# Patient Record
Sex: FEMALE | Race: WHITE | NOT HISPANIC OR LATINO | URBAN - METROPOLITAN AREA
[De-identification: names, ages, dates, MRNs, and addresses within clinical notes are randomized per-mention and may not be internally consistent; named-entity substitution may affect disease eponyms.]

---

## 2017-01-02 ENCOUNTER — OFFICE VISIT (OUTPATIENT)
Dept: FAMILY MEDICINE | Facility: CLINIC | Age: 62
End: 2017-01-02
Payer: COMMERCIAL

## 2017-01-02 VITALS
TEMPERATURE: 97.4 F | SYSTOLIC BLOOD PRESSURE: 138 MMHG | BODY MASS INDEX: 28.53 KG/M2 | HEIGHT: 63 IN | WEIGHT: 161 LBS | DIASTOLIC BLOOD PRESSURE: 88 MMHG | HEART RATE: 85 BPM

## 2017-01-02 DIAGNOSIS — J02.9 PHARYNGITIS, UNSPECIFIED ETIOLOGY: Primary | ICD-10-CM

## 2017-01-02 LAB
DEPRECATED S PYO AG THROAT QL EIA: NORMAL
MICRO REPORT STATUS: NORMAL
SPECIMEN SOURCE: NORMAL

## 2017-01-02 PROCEDURE — 87880 STREP A ASSAY W/OPTIC: CPT | Performed by: FAMILY MEDICINE

## 2017-01-02 PROCEDURE — 87081 CULTURE SCREEN ONLY: CPT | Performed by: FAMILY MEDICINE

## 2017-01-02 PROCEDURE — 99202 OFFICE O/P NEW SF 15 MIN: CPT | Performed by: FAMILY MEDICINE

## 2017-01-02 NOTE — NURSING NOTE
"Chief Complaint   Patient presents with     Pharyngitis       Initial /88 mmHg  Pulse 85  Temp(Src) 97.4  F (36.3  C) (Oral)  Ht 5' 3\" (1.6 m)  Wt 161 lb (73.029 kg)  BMI 28.53 kg/m2  Breastfeeding? No Estimated body mass index is 28.53 kg/(m^2) as calculated from the following:    Height as of this encounter: 5' 3\" (1.6 m).    Weight as of this encounter: 161 lb (73.029 kg).  BP completed using cuff size: gilbert Melvin CMA          "

## 2017-01-02 NOTE — PROGRESS NOTES
"  SUBJECTIVE:                                                    Linda Whyte is a 61 year old female who presents to clinic today for the following health issues:      Sore throat and Ha for 4 days. Some nasal congestion    Linda Whyte is a 61 year old female   with a chief complaint of sore throat.  Onset of symptoms was 4 day(s) ago.    Course of illness: still present and constant.  Severity moderate  Current and Associated symptoms: sore throat, headache and malaise  Treatment measures tried include None tried.  Predisposing factors include None.    History reviewed. No pertinent past medical history.    ALLERGIES:  Review of patient's allergies indicates no known allergies.      No current outpatient prescriptions on file prior to visit.  No current facility-administered medications on file prior to visit.    Social History   Substance Use Topics     Smoking status: Never Smoker      Smokeless tobacco: Not on file     Alcohol Use: Yes      Comment: occ       History reviewed. No pertinent family history.      ROS:  INTEGUMENTARY/SKIN: NEGATIVE for worrisome rashes, moles or lesions  EYES: NEGATIVE for vision changes or irritation  GI: NEGATIVE for nausea, abdominal pain, heartburn, or change in bowel habits    OBJECTIVE:   /88 mmHg  Pulse 85  Temp(Src) 97.4  F (36.3  C) (Oral)  Ht 5' 3\" (1.6 m)  Wt 161 lb (73.029 kg)  BMI 28.53 kg/m2  Breastfeeding? No  GENERAL APPEARANCE: alert, mild distress and cooperative   EYES: EOMI,  PERRL, conjunctiva clear,HENT: ear canals and TM's normal.  Nose normal.  Pharynx erythematous with some exudate noted.,NECK: supple, non-tender to palpation, no adenopathy noted,RESP: lungs clear to auscultation - no rales, rhonchi or wheezes,CV: regular rates and rhythm, normal S1 S2, no murmer noted,ABDOMEN:  soft, nontender, no HSM or masses and bowel sounds normal,SKIN: no suspicious lesions or rashes    Rapid Strep test is negative    ASSESSMENT:  Pharyngitis     - " Rapid strep screen     I discussed with the patient that a confirmatory strep culture will be performed and that she will be called if the culture is positive for strep.  We discussed other possible causes of pharyngitis including cold viruses    Symptomatic treat with gargles, lozenges, and OTC analgesic as needed. Follow-up with primary clinic if not improving.

## 2017-01-04 LAB
BACTERIA SPEC CULT: NORMAL
MICRO REPORT STATUS: NORMAL
SPECIMEN SOURCE: NORMAL

## 2017-04-13 ENCOUNTER — OFFICE VISIT (OUTPATIENT)
Dept: FAMILY MEDICINE | Facility: CLINIC | Age: 62
End: 2017-04-13
Payer: COMMERCIAL

## 2017-04-13 VITALS
TEMPERATURE: 97.6 F | SYSTOLIC BLOOD PRESSURE: 140 MMHG | HEIGHT: 63 IN | DIASTOLIC BLOOD PRESSURE: 90 MMHG | HEART RATE: 64 BPM | OXYGEN SATURATION: 98 % | WEIGHT: 163.3 LBS | BODY MASS INDEX: 28.93 KG/M2

## 2017-04-13 DIAGNOSIS — Z23 NEED FOR PROPHYLACTIC VACCINATION WITH TETANUS-DIPHTHERIA (TD): ICD-10-CM

## 2017-04-13 DIAGNOSIS — Z12.11 SCREEN FOR COLON CANCER: ICD-10-CM

## 2017-04-13 DIAGNOSIS — I10 ESSENTIAL HYPERTENSION: Primary | ICD-10-CM

## 2017-04-13 DIAGNOSIS — Z23 ENCOUNTER FOR IMMUNIZATION: ICD-10-CM

## 2017-04-13 DIAGNOSIS — K21.9 GASTROESOPHAGEAL REFLUX DISEASE WITHOUT ESOPHAGITIS: ICD-10-CM

## 2017-04-13 DIAGNOSIS — Z12.31 VISIT FOR SCREENING MAMMOGRAM: ICD-10-CM

## 2017-04-13 DIAGNOSIS — Z11.59 NEED FOR HEPATITIS C SCREENING TEST: ICD-10-CM

## 2017-04-13 DIAGNOSIS — Z00.00 ENCOUNTER FOR ROUTINE ADULT HEALTH EXAMINATION WITHOUT ABNORMAL FINDINGS: ICD-10-CM

## 2017-04-13 PROCEDURE — 99396 PREV VISIT EST AGE 40-64: CPT | Mod: 25 | Performed by: NURSE PRACTITIONER

## 2017-04-13 PROCEDURE — 90471 IMMUNIZATION ADMIN: CPT | Performed by: NURSE PRACTITIONER

## 2017-04-13 PROCEDURE — 90715 TDAP VACCINE 7 YRS/> IM: CPT | Performed by: NURSE PRACTITIONER

## 2017-04-13 RX ORDER — NAPROXEN 500 MG/1
500 TABLET ORAL 2 TIMES DAILY PRN
Qty: 90 TABLET | Refills: 1 | Status: SHIPPED | OUTPATIENT
Start: 2017-04-13 | End: 2017-12-01

## 2017-04-13 RX ORDER — HYDROCHLOROTHIAZIDE 12.5 MG/1
12.5 CAPSULE ORAL DAILY
Qty: 30 CAPSULE | Refills: 0 | Status: SHIPPED | OUTPATIENT
Start: 2017-04-13 | End: 2017-05-10

## 2017-04-13 RX ORDER — LOSARTAN POTASSIUM 100 MG/1
100 TABLET ORAL DAILY
Qty: 90 TABLET | Refills: 3 | Status: SHIPPED | OUTPATIENT
Start: 2017-04-13 | End: 2018-04-01

## 2017-04-13 RX ORDER — ESTRADIOL 0.5 MG/1
0.5 TABLET ORAL DAILY
Qty: 90 TABLET | Refills: 3 | Status: SHIPPED | OUTPATIENT
Start: 2017-04-13 | End: 2018-04-01

## 2017-04-13 NOTE — NURSING NOTE
"Chief Complaint   Patient presents with     Physical       Initial /90 (BP Location: Right arm, Patient Position: Chair, Cuff Size: Adult Large)  Pulse 64  Temp 97.6  F (36.4  C) (Oral)  Ht 5' 3\" (1.6 m)  Wt 163 lb 4.8 oz (74.1 kg)  SpO2 98%  Breastfeeding? No  BMI 28.93 kg/m2 Estimated body mass index is 28.93 kg/(m^2) as calculated from the following:    Height as of this encounter: 5' 3\" (1.6 m).    Weight as of this encounter: 163 lb 4.8 oz (74.1 kg).  Medication Reconciliation: complete   KELL Mathias      "

## 2017-04-13 NOTE — PROGRESS NOTES
SUBJECTIVE:     CC: Linda Whyte is an 61 year old woman who presents for preventive health visit.     Healthy Habits:    Do you get at least three servings of calcium containing foods daily (dairy, green leafy vegetables, etc.)? yes    Amount of exercise or daily activities, outside of work: 4 day(s) per week    Problems taking medications regularly No    Medication side effects: No    Have you had an eye exam in the past two years? yes    Do you see a dentist twice per year? yes    Do you have sleep apnea, excessive snoring or daytime drowsiness?always has trouble sleeping   Patient is here for a complete physical exam. Recently moved her from [place] to live with her boyfriend. Retired . Active walking daily and eating a healthy diet. Lives in [place] part of the year and [place] the other half of the year.           Today's PHQ-2 Score:   PHQ-2 ( 1999 Pfizer) 1/2/2017   Q1: Little interest or pleasure in doing things 0   Q2: Feeling down, depressed or hopeless 0   PHQ-2 Score 0       Abuse: Current or Past(Physical, Sexual or Emotional)- No  Do you feel safe in your environment - Yes    Social History   Substance Use Topics     Smoking status: Never Smoker     Smokeless tobacco: Never Used     Alcohol use Yes      Comment: occ     The patient does not drink >3 drinks per day nor >7 drinks per week.    No results for input(s): CHOL, HDL, LDL, TRIG, CHOLHDLRATIO, NHDL in the last 04288 hours.    Reviewed orders with patient.  Reviewed health maintenance and updated orders accordingly - Yes    Mammo Decision Support:  Patient over age 50, mutual decision to screen reflected in health maintenance.    Pertinent mammograms are reviewed under the imaging tab.  History of abnormal Pap smear: NO - age 30- 65 PAP every 3 years recommended    Reviewed and updated as needed this visit by clinical staff  Tobacco  Allergies  Meds  Problems  Med Hx  Surg Hx  Fam Hx  Soc Hx       "    Reviewed and updated as needed this visit by Provider  Allergies  Meds  Problems            ROS:  C: NEGATIVE for fever, chills, change in weight  I: NEGATIVE for worrisome rashes, moles or lesions  E: NEGATIVE for vision changes or irritation  ENT: NEGATIVE for ear, mouth and throat problems  R: NEGATIVE for significant cough or SOB  B: NEGATIVE for masses, tenderness or discharge  CV: NEGATIVE for chest pain, palpitations or peripheral edema  GI: NEGATIVE for nausea, abdominal pain, heartburn, or change in bowel habits  : NEGATIVE for unusual urinary or vaginal symptoms. No vaginal bleeding.  M: NEGATIVE for significant arthralgias or myalgia  N: NEGATIVE for weakness, dizziness or paresthesias  P: NEGATIVE for changes in mood or affect     Problem list, Medication list, Allergies, and Medical/Social/Surgical histories reviewed in EPIC and updated as appropriate.  OBJECTIVE:     /90 (BP Location: Right arm, Patient Position: Chair, Cuff Size: Adult Large)  Pulse 64  Temp 97.6  F (36.4  C) (Oral)  Ht 5' 3\" (1.6 m)  Wt 163 lb 4.8 oz (74.1 kg)  SpO2 98%  Breastfeeding? No  BMI 28.93 kg/m2  EXAM:  GENERAL APPEARANCE: healthy, alert and no distress  EYES: Eyes grossly normal to inspection, PERRL and conjunctivae and sclerae normal  HENT: ear canals and TM's normal, nose and mouth without ulcers or lesions, oropharynx clear and oral mucous membranes moist  NECK: no adenopathy, no asymmetry, masses, or scars and thyroid normal to palpation  RESP: lungs clear to auscultation - no rales, rhonchi or wheezes  BREAST: normal without masses, tenderness or nipple discharge and no palpable axillary masses or adenopathy  CV: regular rate and rhythm, normal S1 S2, no S3 or S4, no murmur, click or rub, no peripheral edema and peripheral pulses strong  ABDOMEN: soft, nontender, no hepatosplenomegaly, no masses and bowel sounds normal   (female): normal female external genitalia, normal urethral meatus, " vaginal mucosal atrophy noted, normal cervix, adnexae, and uterus without masses or abnormal discharge  MS: no musculoskeletal defects are noted and gait is age appropriate without ataxia  SKIN: no suspicious lesions or rashes  NEURO: Normal strength and tone, sensory exam grossly normal, mentation intact and speech normal  PSYCH: mentation appears normal and affect normal/bright    ASSESSMENT/PLAN:     1. Encounter for routine adult health examination without abnormal findings  Normal examination with medications refilled. Fasting labs completed today.   - naproxen (NAPROSYN) 500 MG tablet; Take 1 tablet (500 mg) by mouth 2 times daily as needed for moderate pain  Dispense: 90 tablet; Refill: 1  - estradiol (ESTRACE) 0.5 MG tablet; Take 1 tablet (0.5 mg) by mouth daily  Dispense: 90 tablet; Refill: 3  - Lipid panel reflex to direct LDL; Future  - Comprehensive metabolic panel; Future    2. Screen for colon cancer  Unwilling to do colonoscopy but has agreed to a fit test.   - Fecal colorectal cancer screen (FIT); Future    3. Visit for screening mammogram  Mammogram to be scheduled. Clinical breast exam is normal.   - MA SCREENING DIGITAL BILAT - Future  (s+30); Future    4. Need for hepatitis C screening test  Hepatitis C screen x1.  - Hepatitis C Screen Reflex to HCV RNA Quant and Genotype; Future    5. Need for prophylactic vaccination with tetanus-diphtheria (TD)  Tdap given today.     6. Essential hypertension  Blood pressure is borderline. Added HCTZ 12.5 mg daily to bring blood pressure to healthier level. Follow up for nurse only visit for BP check,.   - losartan (COZAAR) 100 MG tablet; Take 1 tablet (100 mg) by mouth daily  Dispense: 90 tablet; Refill: 3  - hydrochlorothiazide (MICROZIDE) 12.5 MG capsule; Take 1 capsule (12.5 mg) by mouth daily  Dispense: 30 capsule; Refill: 0    7. Gastroesophageal reflux disease without esophagitis    - omeprazole (PRILOSEC) 20 MG CR capsule; Take 1 capsule (20 mg) by  "mouth daily  Dispense: 90 capsule; Refill: 3    8. Encounter for immunization    - TDAP VACCINE (ADACEL)    COUNSELING:   Reviewed preventive health counseling, as reflected in patient instructions       Regular exercise       Healthy diet/nutrition       Vision screening       Osteoporosis Prevention/Bone Health       Colon cancer screening       Consider Hep C screening for patients born between 1945 and 1965         reports that she has never smoked. She has never used smokeless tobacco.    Estimated body mass index is 28.93 kg/(m^2) as calculated from the following:    Height as of this encounter: 5' 3\" (1.6 m).    Weight as of this encounter: 163 lb 4.8 oz (74.1 kg).   Weight management plan: Discussed healthy diet and exercise guidelines and patient will follow up in 1 month in clinic to re-evaluate.    Counseling Resources:  ATP IV Guidelines  Pooled Cohorts Equation Calculator  Breast Cancer Risk Calculator  FRAX Risk Assessment  ICSI Preventive Guidelines  Dietary Guidelines for Americans, 2010  USDA's MyPlate  ASA Prophylaxis  Lung CA Screening    Ekaterina Guaman NP  [facility]  "

## 2017-04-13 NOTE — MR AVS SNAPSHOT
After Visit Summary   4/13/2017    Linda Whyte    MRN: [id number]           Patient Information     Date Of Birth          1955        Visit Information        Provider Department      4/13/2017 10:00 AM Ekaterina Guaman NP [facility]        Today's Diagnoses     Essential hypertension    -  1    Encounter for routine adult health examination without abnormal findings        Screen for colon cancer        Visit for screening mammogram        Need for hepatitis C screening test        Need for prophylactic vaccination with tetanus-diphtheria (TD)        Gastroesophageal reflux disease without esophagitis          Care Instructions      Preventive Health Recommendations  Female Ages 50 - 64    Yearly exam: See your health care provider every year in order to  o Review health changes.   o Discuss preventive care.    o Review your medicines if your doctor has prescribed any.      Get a Pap test every three years (unless you have an abnormal result and your provider advises testing more often).    If you get Pap tests with HPV test, you only need to test every 5 years, unless you have an abnormal result.     You do not need a Pap test if your uterus was removed (hysterectomy) and you have not had cancer.    You should be tested each year for STDs (sexually transmitted diseases) if you're at risk.     Have a mammogram every 1 to 2 years.    Have a colonoscopy at age 50, or have a yearly FIT test (stool test). These exams screen for colon cancer.      Have a cholesterol test every 5 years, or more often if advised.    Have a diabetes test (fasting glucose) every three years. If you are at risk for diabetes, you should have this test more often.     If you are at risk for osteoporosis (brittle bone disease), think about having a bone density scan (DEXA).    Shots: Get a flu shot each year. Get a tetanus shot every 10 years.    Nutrition:     Eat at least 5 servings of fruits and  "vegetables each day.    Eat whole-grain bread, whole-wheat pasta and brown rice instead of white grains and rice.    Talk to your provider about Calcium and Vitamin D.     Lifestyle    Exercise at least 150 minutes a week (30 minutes a day, 5 days a week). This will help you control your weight and prevent disease.    Limit alcohol to one drink per day.    No smoking.     Wear sunscreen to prevent skin cancer.     See your dentist every six months for an exam and cleaning.    See your eye doctor every 1 to 2 years.          Follow-ups after your visit        Future tests that were ordered for you today     Open Future Orders        Priority Expected Expires Ordered    MA SCREENING DIGITAL BILAT - Future  (s+30) Routine  4/13/2018 4/13/2017    Fecal colorectal cancer screen (FIT) Routine 5/4/2017 7/6/2017 4/13/2017            Who to contact     If you have questions or need follow up information about today's clinic visit or your schedule please contact [facility] directly at [phone].  Normal or non-critical lab and imaging results will be communicated to you by [employer], letter or phone within 4 business days after the clinic has received the results. If you do not hear from us within 7 days, please contact the clinic through [employer] or phone. If you have a critical or abnormal lab result, we will notify you by phone as soon as possible.  Submit refill requests through "[employer] or call your pharmacy and they will forward the refill request to us. Please allow 3 business days for your refill to be completed.          Additional Information About Your Visit        "[employer] Information     "[employer] lets you send messages to your doctor, view your test results, renew your prescriptions, schedule appointments and more. To sign up, go to [email or web address] . Click on \"Log in\" on the left side of the screen, which will take you to the Welcome page. Then click on \"Sign up Now\" on the right side of the " "page.     You will be asked to enter the access code listed below, as well as some personal information. Please follow the directions to create your username and password.     Your access code is: [email or web address]  Expires: 2017 10:47 AM     Your access code will  in 90 days. If you need help or a new code, please call your [facility] clinic or [phone].        Care EveryWhere ID     This is your Care EveryWhere ID. This could be used by other organizations to access your [facility] medical records  [id number]        Your Vitals Were     Pulse Temperature Height Pulse Oximetry Breastfeeding? BMI (Body Mass Index)    64 97.6  F (36.4  C) (Oral) 5' 3\" (1.6 m) 98% No 28.93 kg/m2       Blood Pressure from Last 3 Encounters:   17 140/90   17 138/88    Weight from Last 3 Encounters:   17 163 lb 4.8 oz (74.1 kg)   17 161 lb (73 kg)              We Performed the Following     Comprehensive metabolic panel     Hepatitis C Screen Reflex to HCV RNA Quant and Genotype     Lipid panel reflex to direct LDL          Today's Medication Changes          These changes are accurate as of: 17 10:47 AM.  If you have any questions, ask your nurse or doctor.               These medicines have changed or have updated prescriptions.        Dose/Directions    * ESTRADIOL PO   This may have changed:  Another medication with the same name was added. Make sure you understand how and when to take each.   Changed by:  Violet Malone MD        Dose:  0.5 mg   Take 0.5 mg by mouth   Refills:  0       * estradiol 0.5 MG tablet   Commonly known as:  ESTRACE   This may have changed:  You were already taking a medication with the same name, and this prescription was added. Make sure you understand how and when to take each.   Used for:  Encounter for routine adult health examination without abnormal findings   Changed by:  Ekaterina Guaman NP        Dose:  0.5 mg   Take 1 tablet (0.5 mg) by mouth daily "   Quantity:  90 tablet   Refills:  3       * LOSARTAN POTASSIUM PO   This may have changed:  Another medication with the same name was added. Make sure you understand how and when to take each.   Changed by:  Violet Malone MD        Dose:  100 mg   Take 100 mg by mouth   Refills:  0       * losartan 100 MG tablet   Commonly known as:  COZAAR   This may have changed:  You were already taking a medication with the same name, and this prescription was added. Make sure you understand how and when to take each.   Used for:  Essential hypertension   Changed by:  Ekaterina Guaman NP        Dose:  100 mg   Take 1 tablet (100 mg) by mouth daily   Quantity:  90 tablet   Refills:  3       * NAPROXEN PO   This may have changed:  Another medication with the same name was added. Make sure you understand how and when to take each.   Changed by:  Violet Malone MD        Dose:  500 mg   Take 500 mg by mouth   Refills:  0       * naproxen 500 MG tablet   Commonly known as:  NAPROSYN   This may have changed:  You were already taking a medication with the same name, and this prescription was added. Make sure you understand how and when to take each.   Used for:  Encounter for routine adult health examination without abnormal findings   Changed by:  Ekaterina Guaman NP        Dose:  500 mg   Take 1 tablet (500 mg) by mouth 2 times daily as needed for moderate pain   Quantity:  90 tablet   Refills:  1       * OMEPRAZOLE PO   This may have changed:  Another medication with the same name was added. Make sure you understand how and when to take each.   Changed by:  Violet Malone MD        Dose:  20 mg   Take 20 mg by mouth   Refills:  0       * omeprazole 20 MG CR capsule   Commonly known as:  priLOSEC   This may have changed:  You were already taking a medication with the same name, and this prescription was added. Make sure you understand how and when to take each.   Used for:  Gastroesophageal reflux disease without  esophagitis   Changed by:  Ekaterina Guaman NP        Dose:  20 mg   Take 1 capsule (20 mg) by mouth daily   Quantity:  90 capsule   Refills:  3       * Notice:  This list has 8 medication(s) that are the same as other medications prescribed for you. Read the directions carefully, and ask your doctor or other care provider to review them with you.         Where to get your medicines      These medications were sent to [place] [address] [place] [facility] [address] [facility]  [address] [facility] [address]    Hours:  Tech issues with their phone system Phone:  [phone]     estradiol 0.5 MG tablet    losartan 100 MG tablet    naproxen 500 MG tablet    omeprazole 20 MG CR capsule                Primary Care Provider Office Phone # Fax #    Ekaterina Guaman [phone]       [facility] [address] [address] [facility] [address]        Thank you!     Thank you for choosing [facility]  for your care. Our goal is always to provide you with excellent care. Hearing back from our patients is one way we can continue to improve our services. Please take a few minutes to complete the written survey that you may receive in the mail after your visit with us. Thank you!             Your Updated Medication List - Protect others around you: Learn how to safely use, store and throw away your medicines at www.disposemymeds.org.          This list is accurate as of: 4/13/17 10:47 AM.  Always use your most recent med list.                   Brand Name Dispense Instructions for use    * ESTRADIOL PO      Take 0.5 mg by mouth       * estradiol 0.5 MG tablet    ESTRACE    90 tablet    Take 1 tablet (0.5 mg) by mouth daily       * LOSARTAN POTASSIUM PO      Take 100 mg by mouth       * losartan 100 MG tablet    COZAAR    90 tablet    Take 1 tablet (100 mg) by mouth daily       * NAPROXEN PO      Take 500 mg by mouth       * naproxen 500 MG tablet    NAPROSYN    90 tablet    Take 1 tablet  (500 mg) by mouth 2 times daily as needed for moderate pain       * OMEPRAZOLE PO      Take 20 mg by mouth       * omeprazole 20 MG CR capsule    priLOSEC    90 capsule    Take 1 capsule (20 mg) by mouth daily       * Notice:  This list has 8 medication(s) that are the same as other medications prescribed for you. Read the directions carefully, and ask your doctor or other care provider to review them with you.

## 2017-04-16 PROBLEM — K21.9 GASTROESOPHAGEAL REFLUX DISEASE WITHOUT ESOPHAGITIS: Status: ACTIVE | Noted: 2017-04-16

## 2017-04-16 PROBLEM — I10 ESSENTIAL HYPERTENSION: Status: ACTIVE | Noted: 2017-04-16

## 2017-04-17 ENCOUNTER — ALLIED HEALTH/NURSE VISIT (OUTPATIENT)
Dept: NURSING | Facility: CLINIC | Age: 62
End: 2017-04-17
Payer: COMMERCIAL

## 2017-04-17 DIAGNOSIS — Z11.59 NEED FOR HEPATITIS C SCREENING TEST: ICD-10-CM

## 2017-04-17 DIAGNOSIS — Z00.00 ENCOUNTER FOR ROUTINE ADULT HEALTH EXAMINATION WITHOUT ABNORMAL FINDINGS: ICD-10-CM

## 2017-04-17 DIAGNOSIS — Z23 ENCOUNTER FOR IMMUNIZATION: Primary | ICD-10-CM

## 2017-04-17 LAB
ALBUMIN SERPL-MCNC: 3.5 G/DL (ref 3.4–5)
ALP SERPL-CCNC: 64 U/L (ref 40–150)
ALT SERPL W P-5'-P-CCNC: 26 U/L (ref 0–50)
ANION GAP SERPL CALCULATED.3IONS-SCNC: 11 MMOL/L (ref 3–14)
AST SERPL W P-5'-P-CCNC: 15 U/L (ref 0–45)
BILIRUB SERPL-MCNC: 0.4 MG/DL (ref 0.2–1.3)
BUN SERPL-MCNC: 26 MG/DL (ref 7–30)
CALCIUM SERPL-MCNC: 9.3 MG/DL (ref 8.5–10.1)
CHLORIDE SERPL-SCNC: 104 MMOL/L (ref 94–109)
CHOLEST SERPL-MCNC: 215 MG/DL
CO2 SERPL-SCNC: 27 MMOL/L (ref 20–32)
CREAT SERPL-MCNC: 0.69 MG/DL (ref 0.52–1.04)
GFR SERPL CREATININE-BSD FRML MDRD: 87 ML/MIN/1.7M2
GLUCOSE SERPL-MCNC: 102 MG/DL (ref 70–99)
HDLC SERPL-MCNC: 64 MG/DL
LDLC SERPL CALC-MCNC: 118 MG/DL
NONHDLC SERPL-MCNC: 151 MG/DL
POTASSIUM SERPL-SCNC: 4.4 MMOL/L (ref 3.4–5.3)
PROT SERPL-MCNC: 7 G/DL (ref 6.8–8.8)
SODIUM SERPL-SCNC: 142 MMOL/L (ref 133–144)
TRIGL SERPL-MCNC: 167 MG/DL

## 2017-04-17 PROCEDURE — 80053 COMPREHEN METABOLIC PANEL: CPT | Performed by: NURSE PRACTITIONER

## 2017-04-17 PROCEDURE — 80061 LIPID PANEL: CPT | Performed by: NURSE PRACTITIONER

## 2017-04-17 PROCEDURE — 90736 HZV VACCINE LIVE SUBQ: CPT

## 2017-04-17 PROCEDURE — 90471 IMMUNIZATION ADMIN: CPT

## 2017-04-17 PROCEDURE — 36415 COLL VENOUS BLD VENIPUNCTURE: CPT | Performed by: NURSE PRACTITIONER

## 2017-04-17 PROCEDURE — 86803 HEPATITIS C AB TEST: CPT | Performed by: NURSE PRACTITIONER

## 2017-04-18 LAB — HCV AB SERPL QL IA: NORMAL

## 2017-04-21 DIAGNOSIS — Z12.11 SCREEN FOR COLON CANCER: ICD-10-CM

## 2017-04-21 PROCEDURE — 82274 ASSAY TEST FOR BLOOD FECAL: CPT | Performed by: NURSE PRACTITIONER

## 2017-04-24 LAB — HEMOCCULT STL QL IA: NEGATIVE

## 2017-05-08 ENCOUNTER — RADIANT APPOINTMENT (OUTPATIENT)
Dept: MAMMOGRAPHY | Facility: CLINIC | Age: 62
End: 2017-05-08
Attending: NURSE PRACTITIONER
Payer: COMMERCIAL

## 2017-05-08 DIAGNOSIS — Z12.31 VISIT FOR SCREENING MAMMOGRAM: ICD-10-CM

## 2017-05-08 PROCEDURE — G0202 SCR MAMMO BI INCL CAD: HCPCS | Mod: TC

## 2017-05-24 ENCOUNTER — ALLIED HEALTH/NURSE VISIT (OUTPATIENT)
Dept: NURSING | Facility: CLINIC | Age: 62
End: 2017-05-24
Payer: COMMERCIAL

## 2017-05-24 VITALS — SYSTOLIC BLOOD PRESSURE: 130 MMHG | DIASTOLIC BLOOD PRESSURE: 80 MMHG

## 2017-05-24 DIAGNOSIS — I10 ESSENTIAL HYPERTENSION: Primary | ICD-10-CM

## 2017-05-24 DIAGNOSIS — Z01.30 BP CHECK: ICD-10-CM

## 2017-05-24 PROCEDURE — 99207 ZZC NO CHARGE NURSE ONLY: CPT

## 2017-06-08 DIAGNOSIS — I10 ESSENTIAL HYPERTENSION: ICD-10-CM

## 2017-06-08 NOTE — TELEPHONE ENCOUNTER
Pending Prescriptions:                       Disp   Refills    hydrochlorothiazide (MICROZIDE) 12.5 MG c*30 cap*0            Sig: TAKE 1 CAPSULE BY MOUTH DAILY          Last Written Prescription Date: 05/10/2017  Last Fill Quantity: 30, # refills: 0  Last Office Visit with [facility], [facility] or [facility] prescribing provider: 04/13/2017       Potassium   Date Value Ref Range Status   04/17/2017 4.4 3.4 - 5.3 mmol/L Final     Creatinine   Date Value Ref Range Status   04/17/2017 0.69 0.52 - 1.04 mg/dL Final     BP Readings from Last 3 Encounters:   05/24/17 130/80   04/13/17 140/90   01/02/17 138/88     Avel VENEGAST

## 2017-06-08 NOTE — TELEPHONE ENCOUNTER
Does patient need follow up lab work since starting HCTZ?  When do you want her to follow up with you?  Aga Smith RN, BSN

## 2017-06-09 RX ORDER — HYDROCHLOROTHIAZIDE 12.5 MG/1
CAPSULE ORAL
Qty: 30 CAPSULE | Refills: 0 | Status: SHIPPED | OUTPATIENT
Start: 2017-06-09 | End: 2017-07-10

## 2017-07-10 DIAGNOSIS — I10 ESSENTIAL HYPERTENSION: ICD-10-CM

## 2017-07-10 NOTE — TELEPHONE ENCOUNTER
Routing refill request to provider for review/approval because:  Hollie given x1 and patient did not follow up, please advise  Aga Smith RN, BSN            HCTZ      Last Written Prescription Date: 6/9/17  Last Fill Quantity: 30, # refills: 0  Last Office Visit with [facility], [facility] or [facility] prescribing provider: 4/13/17       Potassium   Date Value Ref Range Status   04/17/2017 4.4 3.4 - 5.3 mmol/L Final     Creatinine   Date Value Ref Range Status   04/17/2017 0.69 0.52 - 1.04 mg/dL Final     BP Readings from Last 3 Encounters:   05/24/17 130/80   04/13/17 140/90   01/02/17 138/88

## 2017-07-12 NOTE — TELEPHONE ENCOUNTER
[facility] calling to check status of refill. Informed that pt needs BP check    Tim Cheung   07/12/17

## 2017-07-13 RX ORDER — HYDROCHLOROTHIAZIDE 12.5 MG/1
CAPSULE ORAL
Qty: 15 CAPSULE | Refills: 0 | Status: SHIPPED | OUTPATIENT
Start: 2017-07-13 | End: 2017-08-07

## 2017-08-08 ENCOUNTER — TELEPHONE (OUTPATIENT)
Dept: FAMILY MEDICINE | Facility: CLINIC | Age: 62
End: 2017-08-08

## 2017-08-08 ENCOUNTER — ALLIED HEALTH/NURSE VISIT (OUTPATIENT)
Dept: NURSING | Facility: CLINIC | Age: 62
End: 2017-08-08
Payer: COMMERCIAL

## 2017-08-08 VITALS — DIASTOLIC BLOOD PRESSURE: 88 MMHG | HEART RATE: 61 BPM | SYSTOLIC BLOOD PRESSURE: 150 MMHG

## 2017-08-08 DIAGNOSIS — Z01.30 BP CHECK: Primary | ICD-10-CM

## 2017-08-08 PROCEDURE — 99207 ZZC NO CHARGE NURSE ONLY: CPT

## 2017-08-08 NOTE — TELEPHONE ENCOUNTER
BP Readings from Last 3 Encounters:   08/08/17 150/88   05/24/17 130/80   04/13/17 140/90      Pt in for BP check. Pulse today 61. No symptom complaints. Pt can be reached at [phone].      Mateo Melvin CMA

## 2017-08-08 NOTE — NURSING NOTE
BP Readings from Last 3 Encounters:   08/08/17 150/88   05/24/17 130/80   04/13/17 140/90     Pt in for BP check. Pulse today 61. No symptom complaints. Pt can be reached at [phone].     Mateo Melvin CMA

## 2017-08-08 NOTE — MR AVS SNAPSHOT
After Visit Summary   8/8/2017    Linda Whyte    MRN: [id number]           Patient Information     Date Of Birth          1955        Visit Information        Provider Department      8/8/2017 3:00 PM IZAIAH FABIAN/LPN [facility]        Today's Diagnoses     BP check    -  1       Follow-ups after your visit        Who to contact     If you have questions or need follow up information about today's clinic visit or your schedule please contact [facility] directly at [phone].  Normal or non-critical lab and imaging results will be communicated to you by Jolancerhart, letter or phone within 4 business days after the clinic has received the results. If you do not hear from us within 7 days, please contact the clinic through StepOutt or phone. If you have a critical or abnormal lab result, we will notify you by phone as soon as possible.  Submit refill requests through RivalSoft or call your pharmacy and they will forward the refill request to us. Please allow 3 business days for your refill to be completed.          Additional Information About Your Visit        MyChart Information     RivalSoft gives you secure access to your electronic health record. If you see a primary care provider, you can also send messages to your care team and make appointments. If you have questions, please call your primary care clinic.  If you do not have a primary care provider, please call [phone] and they will assist you.        Care EveryWhere ID     This is your Care EveryWhere ID. This could be used by other organizations to access your [place] medical records  [id number]        Your Vitals Were     Pulse                   61            Blood Pressure from Last 3 Encounters:   08/08/17 150/88   05/24/17 130/80   04/13/17 140/90    Weight from Last 3 Encounters:   04/13/17 163 lb 4.8 oz (74.1 kg)   01/02/17 161 lb (73 kg)              Today, you had the following     No orders found for  display       Primary Care Provider Office Phone # Fax #    Ekaterina Guaman [phone]       [facility] [address] [address] [facility] [address]        Equal Access to Services     PHILIPP REID : Hadii rangel ku hadóscaro Soomaali, waaxda luqadaha, qaybta kaalmada adeegyada, waxay idiin hayserenan adezach hernandez labrennen norwood. So [facility] [phone].    ATENCIÓN: Si habla español, tiene a juárez disposición servicios gratuitos de asistencia lingüística. Llame al [phone].    We comply with applicable federal civil rights laws and [place] laws. We do not discriminate on the basis of race, color, national origin, age, disability sex, sexual orientation or gender identity.            Thank you!     Thank you for choosing [facility]  for your care. Our goal is always to provide you with excellent care. Hearing back from our patients is one way we can continue to improve our services. Please take a few minutes to complete the written survey that you may receive in the mail after your visit with us. Thank you!             Your Updated Medication List - Protect others around you: Learn how to safely use, store and throw away your medicines at www.disposemymeds.org.          This list is accurate as of: 8/8/17  3:25 PM.  Always use your most recent med list.                   Brand Name Dispense Instructions for use Diagnosis    estradiol 0.5 MG tablet    ESTRACE    90 tablet    Take 1 tablet (0.5 mg) by mouth daily    Encounter for routine adult health examination without abnormal findings       hydrochlorothiazide 12.5 MG capsule    MICROZIDE    30 capsule    TAKE 1 CAPSULE BY MOUTH DAILY    Essential hypertension       losartan 100 MG tablet    COZAAR    90 tablet    Take 1 tablet (100 mg) by mouth daily    Essential hypertension       naproxen 500 MG tablet    NAPROSYN    90 tablet    Take 1 tablet (500 mg) by mouth 2 times daily as needed for moderate pain    Encounter for routine adult  health examination without abnormal findings       omeprazole 20 MG CR capsule    priLOSEC    90 capsule    Take 1 capsule (20 mg) by mouth daily    Gastroesophageal reflux disease without esophagitis

## 2017-10-16 ENCOUNTER — TELEPHONE (OUTPATIENT)
Dept: FAMILY MEDICINE | Facility: CLINIC | Age: 62
End: 2017-10-16

## 2017-10-16 ENCOUNTER — NURSE TRIAGE (OUTPATIENT)
Dept: NURSING | Facility: CLINIC | Age: 62
End: 2017-10-16

## 2017-10-16 NOTE — TELEPHONE ENCOUNTER
Per PCP pt needs to be seen even if just nurse BP check-      Call to pt she is not flying out until 7 pm.  Advised would likely be best to see PCP that way if any adjustment is needed can be done at [facility]     Pt scheduled at 10 am with PCP     Pt expressed understanding and acceptance of the plan.  Pt had no further questions at this time.  Advised can call back to clinic at any time with concerns.       Dorothea Jimenez RN

## 2017-10-16 NOTE — TELEPHONE ENCOUNTER
Clinic Action Needed: Please call back Patient to advise ASAP today  .   Reason for Call: Patient [facility] ,[facility] . Started this Rx she states in April 2017 and only get one month at a time from provider. Pt  will be leaving for [place] for tomorrow  until [place] . Last /95 on 10/13/17   &   148/86 on 10/6/17 .  Expects to see her in April for physical .   Routed to:Sent to PCP's nurse pool.   Toshia Santiago RN, [facility]

## 2017-10-17 ENCOUNTER — OFFICE VISIT (OUTPATIENT)
Dept: FAMILY MEDICINE | Facility: CLINIC | Age: 62
End: 2017-10-17
Payer: COMMERCIAL

## 2017-10-17 VITALS
TEMPERATURE: 99.1 F | SYSTOLIC BLOOD PRESSURE: 136 MMHG | HEIGHT: 63 IN | DIASTOLIC BLOOD PRESSURE: 84 MMHG | HEART RATE: 76 BPM | OXYGEN SATURATION: 97 % | BODY MASS INDEX: 28.88 KG/M2 | WEIGHT: 163 LBS | RESPIRATION RATE: 16 BRPM

## 2017-10-17 DIAGNOSIS — Z23 NEED FOR PROPHYLACTIC VACCINATION AND INOCULATION AGAINST INFLUENZA: Primary | ICD-10-CM

## 2017-10-17 DIAGNOSIS — I10 ESSENTIAL HYPERTENSION: ICD-10-CM

## 2017-10-17 PROCEDURE — 90686 IIV4 VACC NO PRSV 0.5 ML IM: CPT | Performed by: NURSE PRACTITIONER

## 2017-10-17 PROCEDURE — 90471 IMMUNIZATION ADMIN: CPT | Performed by: NURSE PRACTITIONER

## 2017-10-17 PROCEDURE — 99213 OFFICE O/P EST LOW 20 MIN: CPT | Mod: 25 | Performed by: NURSE PRACTITIONER

## 2017-10-17 RX ORDER — HYDROCHLOROTHIAZIDE 12.5 MG/1
CAPSULE ORAL
Qty: 90 CAPSULE | Refills: 1 | Status: SHIPPED | OUTPATIENT
Start: 2017-10-17 | End: 2018-05-01

## 2017-10-17 NOTE — NURSING NOTE
"Chief Complaint   Patient presents with     Hypertension     Recheck Medication       Initial /84 (BP Location: Right arm, Patient Position: Chair, Cuff Size: Adult Regular)  Pulse 76  Temp 99.1  F (37.3  C) (Oral)  Resp 16  Ht 5' 3\" (1.6 m)  Wt 163 lb (73.9 kg)  SpO2 97%  Breastfeeding? No  BMI 28.87 kg/m2 Estimated body mass index is 28.87 kg/(m^2) as calculated from the following:    Height as of this encounter: 5' 3\" (1.6 m).    Weight as of this encounter: 163 lb (73.9 kg).  Medication Reconciliation: complete     Cordell Briscoe CMA      "

## 2017-10-17 NOTE — MR AVS SNAPSHOT
"              After Visit Summary   10/17/2017    Linda Whyte    MRN: [id number]           Patient Information     Date Of Birth          1955        Visit Information        Provider Department      10/17/2017 10:00 AM Ekaterina Guaman NP [facility]        Today's Diagnoses     Need for prophylactic vaccination and inoculation against influenza    -  1    Essential hypertension           Follow-ups after your visit        Who to contact     If you have questions or need follow up information about today's clinic visit or your schedule please contact [facility] directly at [phone].  Normal or non-critical lab and imaging results will be communicated to you by AXADOhart, letter or phone within 4 business days after the clinic has received the results. If you do not hear from us within 7 days, please contact the clinic through [employer]t or phone. If you have a critical or abnormal lab result, we will notify you by phone as soon as possible.  Submit refill requests through Fisoc or call your pharmacy and they will forward the refill request to us. Please allow 3 business days for your refill to be completed.          Additional Information About Your Visit        MyChart Information     F[employer]oc gives you secure access to your electronic health record. If you see a primary care provider, you can also send messages to your care team and make appointments. If you have questions, please call your primary care clinic.  If you do not have a primary care provider, please call [phone] and they will assist you.        Care EveryWhere ID     This is your Care EveryWhere ID. This could be used by other organizations to access your [place] medical records  [id number]        Your Vitals Were     Pulse Temperature Respirations Height Pulse Oximetry Breastfeeding?    76 99.1  F (37.3  C) (Oral) 16 5' 3\" (1.6 m) 97% No    BMI (Body Mass Index)                   28.87 kg/m2            " Blood Pressure from Last 3 Encounters:   10/17/17 136/84   08/08/17 150/88   05/24/17 130/80    Weight from Last 3 Encounters:   10/17/17 163 lb (73.9 kg)   04/13/17 163 lb 4.8 oz (74.1 kg)   01/02/17 161 lb (73 kg)              We Performed the Following     FLU VAC, SPLIT VIRUS IM > 3 YO (QUADRIVALENT) [24059]     Vaccine Administration, Initial [46928]          Today's Medication Changes          These changes are accurate as of: 10/17/17 10:36 AM.  If you have any questions, ask your nurse or doctor.               These medicines have changed or have updated prescriptions.        Dose/Directions    hydrochlorothiazide 12.5 MG capsule   Commonly known as:  MICROZIDE   This may have changed:  See the new instructions.   Used for:  Essential hypertension   Changed by:  Ekaterina Guaman NP        TAKE 1 CAPSULE BY MOUTH DAILY   Quantity:  90 capsule   Refills:  1            Where to get your medicines      These medications were sent to [address] [address] [address] [address]    Hours:  Tech issues with their phone system Phone:  [phone]     hydrochlorothiazide 12.5 MG capsule                Primary Care Provider Office Phone # Fax #    Ekaterina Guaman [phone]       [facility] [id number] [place] [address]        Equal Access to Services     PHILIPP REID AH: Hadii rangel ku hadasho Soomaali, waaxda luqadaha, qaybta kaalmada adeegyada, lia raymundo haygwendolyn norwood. So [facility] [phone].    ATENCIÓN: Si habla español, tiene a juárez disposición servicios gratuitos de asistencia lingüística. Isamar al [phone].    We comply with applicable federal civil rights laws and [place] laws. We do not discriminate on the basis of race, color, national origin, age, disability, sex, sexual orientation, or gender identity.            Thank you!     Thank you for choosing [facility]  for your care. Our  goal is always to provide you with excellent care. Hearing back from our patients is one way we can continue to improve our services. Please take a few minutes to complete the written survey that you may receive in the mail after your visit with us. Thank you!             Your Updated Medication List - Protect others around you: Learn how to safely use, store and throw away your medicines at www.disposemymeds.org.          This list is accurate as of: 10/17/17 10:36 AM.  Always use your most recent med list.                   Brand Name Dispense Instructions for use Diagnosis    estradiol 0.5 MG tablet    ESTRACE    90 tablet    Take 1 tablet (0.5 mg) by mouth daily    Encounter for routine adult health examination without abnormal findings       hydrochlorothiazide 12.5 MG capsule    MICROZIDE    90 capsule    TAKE 1 CAPSULE BY MOUTH DAILY    Essential hypertension       losartan 100 MG tablet    COZAAR    90 tablet    Take 1 tablet (100 mg) by mouth daily    Essential hypertension       naproxen 500 MG tablet    NAPROSYN    90 tablet    Take 1 tablet (500 mg) by mouth 2 times daily as needed for moderate pain    Encounter for routine adult health examination without abnormal findings       omeprazole 20 MG CR capsule    priLOSEC    90 capsule    Take 1 capsule (20 mg) by mouth daily    Gastroesophageal reflux disease without esophagitis

## 2017-10-17 NOTE — PROGRESS NOTES
SUBJECTIVE:   Linda Whyte is a 61 year old female who presents to clinic today for the following health issues:      Hypertension Follow-up      Outpatient blood pressures are being checked at [facility].  Results are 140's/80's.    Low Salt Diet: low salt    Amount of exercise or physical activity: 4-5 days/week for an average of 45-60 minutes    Problems taking medications regularly: No    Medication side effects: none  Diet: regular (no restrictions) and clean eating    Medication Followup and refill    Taking Medication as prescribed: yes    Side Effects:  None    Medication Helping Symptoms:  yes   Patient is here to follow-up on diagnosis of hypertension. Currently is taking losartan 100 mg once daily and had hydrochlorothiazide added. Labs are up-to-date. Requesting refills for 6 months and she spends most of the winter in [place]. Try to be more active and following a low-sodium diet. Frequently checks her blood pressure at [employer] [facility] and remains borderline. Patellar medications without any side effects.            Problem list and histories reviewed & adjusted, as indicated.  Additional history: none    Patient Active Problem List   Diagnosis     Essential hypertension     Gastroesophageal reflux disease without esophagitis     Past Surgical History:   Procedure Laterality Date     HYSTERECTOMY Bilateral        Social History   Substance Use Topics     Smoking status: Never Smoker     Smokeless tobacco: Never Used     Alcohol use Yes      Comment: occ     History reviewed. No pertinent family history.          Reviewed and updated as needed this visit by clinical staff     Reviewed and updated as needed this visit by Provider         ROS:  Constitutional, HEENT, cardiovascular, pulmonary, gi and gu systems are negative, except as otherwise noted.      OBJECTIVE:   /84 (BP Location: Right arm, Patient Position: Chair, Cuff Size: Adult Regular)  Pulse 76  Temp 99.1  F (37.3  C) (Oral)  Resp 16   "Ht 5' 3\" (1.6 m)  Wt 163 lb (73.9 kg)  SpO2 97%  Breastfeeding? No  BMI 28.87 kg/m2  Body mass index is 28.87 kg/(m^2).  GENERAL: healthy, alert and no distress  RESP: lungs clear to auscultation - no rales, rhonchi or wheezes  CV: regular rate and rhythm, normal S1 S2, no S3 or S4, no murmur, click or rub, no peripheral edema and peripheral pulses strong  MS: no gross musculoskeletal defects noted, no edema  PSYCH: mentation appears normal, affect normal/bright        ASSESSMENT/PLAN:             1. Essential hypertension   Improved. Will refill hydrochlorothiazide 12.5 mg once daily. Follow-up in 6 months.  - hydrochlorothiazide (MICROZIDE) 12.5 MG capsule; TAKE 1 CAPSULE BY MOUTH DAILY  Dispense: 90 capsule; Refill: 1    2. Need for prophylactic vaccination and inoculation against influenza   Flu vaccine given today.  - FLU VAC, SPLIT VIRUS IM > 3 YO (QUADRIVALENT) [85751]  - Vaccine Administration, Initial [39923]    Return to clinic in 6 months for complete physical exam.    Ekaterina Guaman, NP  [facility]  Injectable Influenza Immunization Documentation    1.  Is the person to be vaccinated sick today?   No    2. Does the person to be vaccinated have an allergy to a component   of the vaccine?   No    3. Has the person to be vaccinated ever had a serious reaction   to influenza vaccine in the past?   No    4. Has the person to be vaccinated ever had Guillain-Barré syndrome?   No    Form completed by Cordell Briscoe CMA           "

## 2018-04-01 DIAGNOSIS — K21.9 GASTROESOPHAGEAL REFLUX DISEASE WITHOUT ESOPHAGITIS: ICD-10-CM

## 2018-04-01 DIAGNOSIS — Z00.00 ENCOUNTER FOR ROUTINE ADULT HEALTH EXAMINATION WITHOUT ABNORMAL FINDINGS: ICD-10-CM

## 2018-04-01 DIAGNOSIS — I10 ESSENTIAL HYPERTENSION: ICD-10-CM

## 2018-04-02 RX ORDER — LOSARTAN POTASSIUM 100 MG/1
TABLET ORAL
Qty: 90 TABLET | Refills: 0 | Status: SHIPPED | OUTPATIENT
Start: 2018-04-02 | End: 2018-05-14 | Stop reason: DRUGHIGH

## 2018-04-02 RX ORDER — ESTRADIOL 0.5 MG/1
TABLET ORAL
Qty: 90 TABLET | Refills: 0 | Status: SHIPPED | OUTPATIENT
Start: 2018-04-02 | End: 2018-05-14 | Stop reason: DRUGHIGH

## 2018-04-02 NOTE — TELEPHONE ENCOUNTER
"Requested Prescriptions   Pending Prescriptions Disp Refills     losartan (COZAAR) 100 MG tablet [Pharmacy Med Name: LOSARTAN POTASSIUM 100 MG TAB] 90 tablet 3    Last Written Prescription Date:  04/13/2017  Last Fill Quantity: 90 tablet,  # refills: 3   Last office visit: 10/17/2017 with prescribing provider:  10/17/2017   Future Office Visit:     Sig: TAKE 1 TABLET (100 MG) BY MOUTH DAILY    Angiotensin-II Receptors Passed    4/1/2018  1:43 AM       Passed - Blood pressure under 140/90 in past 12 months    BP Readings from Last 3 Encounters:   10/17/17 136/84   08/08/17 150/88   05/24/17 130/80                Passed - Recent (12 mo) or future (30 days) visit within the authorizing provider's specialty    Patient had office visit in the last 12 months or has a visit in the next 30 days with authorizing provider or within the authorizing provider's specialty.  See \"Patient Info\" tab in inbasket, or \"Choose Columns\" in Meds & Orders section of the refill encounter.           Passed - Patient is age 18 or older       Passed - No active pregnancy on record       Passed - Normal serum creatinine on file in past 12 months    Recent Labs   Lab Test  04/17/17   0809   CR  0.69            Passed - Normal serum potassium on file in past 12 months    Recent Labs   Lab Test  04/17/17   0809   POTASSIUM  4.4                   Passed - No positive pregnancy test in past 12 months              estradiol (ESTRACE) 0.5 MG tablet [Pharmacy Med Name: ESTRADIOL 0.5 MG TABLET] 90 tablet 3    Last Written Prescription Date:  04/13/2017  Last Fill Quantity: 90 tablet,  # refills: 3   Last office visit: 10/17/2017 with prescribing provider:  10/17/2017   Future Office Visit:     Sig: TAKE 1 TABLET (0.5 MG) BY MOUTH DAILY    Hormone Replacement Therapy Passed    4/1/2018  1:43 AM       Passed - Blood pressure under 140/90 in past 12 months    BP Readings from Last 3 Encounters:   10/17/17 136/84   08/08/17 150/88   05/24/17 130/80          " "      Passed - Recent (12 mo) or future (30 days) visit within the authorizing provider's specialty    Patient had office visit in the last 12 months or has a visit in the next 30 days with authorizing provider or within the authorizing provider's specialty.  See \"Patient Info\" tab in inbasket, or \"Choose Columns\" in Meds & Orders section of the refill encounter.           Passed - Patient has mammogram in past 2 years on file if age 50-75       Passed - Patient is 18 years of age or older       Passed - No active pregnancy on record       Passed - No positive pregnancy test on record in past 12 months              omeprazole (PRILOSEC) 20 MG CR capsule [Pharmacy Med Name: OMEPRAZOLE DR 20 MG CAPSULE] 90 capsule 3    Last Written Prescription Date:  04/13/2017  Last Fill Quantity: 90 capsule,  # refills: 3   Last office visit: 10/17/2017 with prescribing provider:  10/17/2017   Future Office Visit:     Sig: TAKE 1 CAPSULE (20 MG) BY MOUTH DAILY    PPI Protocol Passed    4/1/2018  1:43 AM       Passed - Not on Clopidogrel (unless Pantoprazole ordered)       Passed - No diagnosis of osteoporosis on record       Passed - Recent (12 mo) or future (30 days) visit within the authorizing provider's specialty    Patient had office visit in the last 12 months or has a visit in the next 30 days with authorizing provider or within the authorizing provider's specialty.  See \"Patient Info\" tab in inbasket, or \"Choose Columns\" in Meds & Orders section of the refill encounter.           Passed - Patient is age 18 or older       Passed - No active pregnacy on record       Passed - No positive pregnancy test in past 12 months          Avel MONTANO  "

## 2018-04-02 NOTE — TELEPHONE ENCOUNTER
Medication is being filled for 1 time refill only due to:  Patient needs to be seen because due for physical.  Mychart sent.

## 2018-05-01 DIAGNOSIS — I10 ESSENTIAL HYPERTENSION: ICD-10-CM

## 2018-05-01 RX ORDER — HYDROCHLOROTHIAZIDE 12.5 MG/1
CAPSULE ORAL
Qty: 90 CAPSULE | Refills: 0 | Status: SHIPPED | OUTPATIENT
Start: 2018-05-01 | End: 2018-05-14 | Stop reason: DRUGHIGH

## 2018-05-01 NOTE — TELEPHONE ENCOUNTER
"Requested Prescriptions   Pending Prescriptions Disp Refills     hydrochlorothiazide (MICROZIDE) 12.5 MG capsule [Pharmacy Med Name: HYDROCHLOROTHIAZIDE 12.5 MG CP] 90 capsule 1    Last Written Prescription Date:  10/17/2017  Last Fill Quantity: 90 capsule,  # refills: 1   Last office visit: 10/17/2017 with prescribing provider:  10/17/2017   Future Office Visit:   Next 5 appointments (look out 90 days)     May 09, 2018  9:30 AM CDT   PHYSICAL with Ekaterina Guaman NP   [facility]    [address] [facility] [address] [phone]                  Sig: TAKE 1 CAPSULE BY MOUTH DAILY    Diuretics (Including Combos) Protocol Failed    5/1/2018  2:43 AM       Failed - Normal serum creatinine on file in past 12 months    Recent Labs   Lab Test  04/17/17   0809   CR  0.69             Failed - Normal serum potassium on file in past 12 months    Recent Labs   Lab Test  04/17/17   0809   POTASSIUM  4.4                   Failed - Normal serum sodium on file in past 12 months    Recent Labs   Lab Test  04/17/17   0809   NA  142             Passed - Blood pressure under 140/90 in past 12 months    BP Readings from Last 3 Encounters:   10/17/17 136/84   08/08/17 150/88   05/24/17 130/80                Passed - Recent (12 mo) or future (30 days) visit within the authorizing provider's specialty    Patient had office visit in the last 12 months or has a visit in the next 30 days with authorizing provider or within the authorizing provider's specialty.  See \"Patient Info\" tab in inbasket, or \"Choose Columns\" in Meds & Orders section of the refill encounter.           Passed - Patient is age 18 or older       Passed - No active pregancy on record       Passed - No positive pregnancy test in past 12 months          Avel VENEGAST  "

## 2018-05-01 NOTE — TELEPHONE ENCOUNTER
Prescription approved per [facility] Refill Protocol. For one time fill.   Lab will get check at [facility] on 5/9 last set of labs with PENELOPE Jimenez RN

## 2018-05-14 ENCOUNTER — OFFICE VISIT (OUTPATIENT)
Dept: FAMILY MEDICINE | Facility: CLINIC | Age: 63
End: 2018-05-14
Payer: COMMERCIAL

## 2018-05-14 ENCOUNTER — ALLIED HEALTH/NURSE VISIT (OUTPATIENT)
Dept: NURSING | Facility: CLINIC | Age: 63
End: 2018-05-14
Payer: COMMERCIAL

## 2018-05-14 VITALS
OXYGEN SATURATION: 98 % | WEIGHT: 162.9 LBS | HEIGHT: 63 IN | DIASTOLIC BLOOD PRESSURE: 100 MMHG | BODY MASS INDEX: 28.86 KG/M2 | TEMPERATURE: 98.4 F | HEART RATE: 70 BPM | RESPIRATION RATE: 16 BRPM | SYSTOLIC BLOOD PRESSURE: 150 MMHG

## 2018-05-14 VITALS — DIASTOLIC BLOOD PRESSURE: 90 MMHG | SYSTOLIC BLOOD PRESSURE: 162 MMHG

## 2018-05-14 DIAGNOSIS — K21.9 GASTROESOPHAGEAL REFLUX DISEASE WITHOUT ESOPHAGITIS: ICD-10-CM

## 2018-05-14 DIAGNOSIS — Z00.00 ENCOUNTER FOR ROUTINE ADULT HEALTH EXAMINATION WITHOUT ABNORMAL FINDINGS: ICD-10-CM

## 2018-05-14 DIAGNOSIS — G47.00 INSOMNIA, UNSPECIFIED TYPE: ICD-10-CM

## 2018-05-14 DIAGNOSIS — I10 ESSENTIAL HYPERTENSION: ICD-10-CM

## 2018-05-14 DIAGNOSIS — Z01.30 BP CHECK: Primary | ICD-10-CM

## 2018-05-14 DIAGNOSIS — Z00.00 ROUTINE GENERAL MEDICAL EXAMINATION AT A HEALTH CARE FACILITY: Primary | ICD-10-CM

## 2018-05-14 PROCEDURE — 99207 ZZC NO CHARGE NURSE ONLY: CPT

## 2018-05-14 PROCEDURE — 99396 PREV VISIT EST AGE 40-64: CPT | Performed by: NURSE PRACTITIONER

## 2018-05-14 RX ORDER — TRAZODONE HYDROCHLORIDE 50 MG/1
50 TABLET, FILM COATED ORAL
Qty: 30 TABLET | Refills: 1 | Status: SHIPPED | OUTPATIENT
Start: 2018-05-14 | End: 2018-12-20

## 2018-05-14 RX ORDER — NAPROXEN 500 MG/1
TABLET ORAL
Qty: 90 TABLET | Refills: 0 | Status: SHIPPED | OUTPATIENT
Start: 2018-05-14

## 2018-05-14 RX ORDER — ESTRADIOL 0.5 MG/1
TABLET ORAL
Qty: 90 TABLET | Refills: 3 | Status: SHIPPED | OUTPATIENT
Start: 2018-05-14 | End: 2019-02-12

## 2018-05-14 RX ORDER — LOSARTAN POTASSIUM 100 MG/1
TABLET ORAL
Qty: 90 TABLET | Refills: 3 | Status: SHIPPED | OUTPATIENT
Start: 2018-05-14

## 2018-05-14 RX ORDER — HYDROCHLOROTHIAZIDE 25 MG/1
25 TABLET ORAL DAILY
Qty: 90 TABLET | Refills: 3 | Status: SHIPPED | OUTPATIENT
Start: 2018-05-14

## 2018-05-14 NOTE — MR AVS SNAPSHOT
After Visit Summary   5/14/2018    Linda Whyte    MRN: [id number]           Patient Information     Date Of Birth          1955        Visit Information        Provider Department      5/14/2018 1:00 PM Ekaterina Guaman NP [facility]        Today's Diagnoses     Routine general medical examination at a health care facility    -  1    Essential hypertension        Encounter for routine adult health examination without abnormal findings        Gastroesophageal reflux disease without esophagitis          Care Instructions      Preventive Health Recommendations  Female Ages 50 - 64    Yearly exam: See your health care provider every year in order to  o Review health changes.   o Discuss preventive care.    o Review your medicines if your doctor has prescribed any.      Get a Pap test every three years (unless you have an abnormal result and your provider advises testing more often).    If you get Pap tests with HPV test, you only need to test every 5 years, unless you have an abnormal result.     You do not need a Pap test if your uterus was removed (hysterectomy) and you have not had cancer.    You should be tested each year for STDs (sexually transmitted diseases) if you're at risk.     Have a mammogram every 1 to 2 years.    Have a colonoscopy at age 50, or have a yearly FIT test (stool test). These exams screen for colon cancer.      Have a cholesterol test every 5 years, or more often if advised.    Have a diabetes test (fasting glucose) every three years. If you are at risk for diabetes, you should have this test more often.     If you are at risk for osteoporosis (brittle bone disease), think about having a bone density scan (DEXA).    Shots: Get a flu shot each year. Get a tetanus shot every 10 years.    Nutrition:     Eat at least 5 servings of fruits and vegetables each day.    Eat whole-grain bread, whole-wheat pasta and brown rice instead of white grains and  rice.    Talk to your provider about Calcium and Vitamin D.     Lifestyle    Exercise at least 150 minutes a week (30 minutes a day, 5 days a week). This will help you control your weight and prevent disease.    Limit alcohol to one drink per day.    No smoking.     Wear sunscreen to prevent skin cancer.     See your dentist every six months for an exam and cleaning.    See your eye doctor every 1 to 2 years.            Follow-ups after your visit        Follow-up notes from your care team     Return in about 1 year (around 5/14/2019) for Physical Exam, BP Recheck in 2 weeks.      Your next 10 appointments already scheduled     May 14, 2018  2:00 PM CDT   Nurse Only with LV MA/LPN   [facility]    [id number] [place] [id number] [phone]              Future tests that were ordered for you today     Open Future Orders        Priority Expected Expires Ordered    Comprehensive metabolic panel Routine  5/14/2019 5/14/2018    Lipid panel reflex to direct LDL Fasting Routine  5/14/2019 5/14/2018            Who to contact     If you have questions or need follow up information about today's clinic visit or your schedule please contact [facility] directly at [phone].  Normal or non-critical lab and imaging results will be communicated to you by gloStreamhart, letter or phone within 4 business days after the clinic has received the results. If you do not hear from us within 7 days, please contact the clinic through gloStreamhart or phone. If you have a critical or abnormal lab result, we will notify you by phone as soon as possible.  Submit refill requests through [employer] or call your pharmacy and they will forward the refill request to us. Please allow 3 business days for your refill to be completed.          Additional Information About Your Visit        gloStreamharAdometry By [employer] Information     KnowledgeMill gives you secure access to your electronic health record. If you  "see a primary care provider, you can also send messages to your care team and make appointments. If you have questions, please call your primary care clinic.  If you do not have a primary care provider, please call [phone] and they will assist you.        Care EveryWhere ID     This is your Care EveryWhere ID. This could be used by other organizations to access your [facility] medical records  [id number]        Your Vitals Were     Pulse Temperature Respirations Height Pulse Oximetry BMI (Body Mass Index)    70 98.4  F (36.9  C) (Oral) 16 5' 3\" (1.6 m) 98% 28.86 kg/m2       Blood Pressure from Last 3 Encounters:   05/14/18 (!) 150/100   10/17/17 136/84   08/08/17 150/88    Weight from Last 3 Encounters:   05/14/18 162 lb 14.4 oz (73.9 kg)   10/17/17 163 lb (73.9 kg)   04/13/17 163 lb 4.8 oz (74.1 kg)                 Today's Medication Changes          These changes are accurate as of 5/14/18  1:35 PM.  If you have any questions, ask your nurse or doctor.               Start taking these medicines.        Dose/Directions    hydrochlorothiazide 25 MG tablet   Commonly known as:  HYDRODIURIL   Used for:  Essential hypertension   Replaces:  hydrochlorothiazide 12.5 MG capsule   Started by:  Ekaterina Guaman NP        Dose:  25 mg   Take 1 tablet (25 mg) by mouth daily   Quantity:  90 tablet   Refills:  3         These medicines have changed or have updated prescriptions.        Dose/Directions    estradiol 0.5 MG tablet   Commonly known as:  ESTRACE   This may have changed:  See the new instructions.   Used for:  Encounter for routine adult health examination without abnormal findings   Changed by:  Ekaterina Guaman NP        TAKE 1 TABLET (0.5 MG) BY MOUTH DAILY   Quantity:  90 tablet   Refills:  3       losartan 100 MG tablet   Commonly known as:  COZAAR   This may have changed:  See the new instructions.   Used for:  Essential hypertension   Changed by:  Ekaterina Guaman NP        TAKE 1 TABLET (100 MG) BY MOUTH " DAILY   Quantity:  90 tablet   Refills:  3       naproxen 500 MG tablet   Commonly known as:  NAPROSYN   This may have changed:  See the new instructions.   Used for:  Encounter for routine adult health examination without abnormal findings   Changed by:  Ekaterina Guaman NP        TAKE 1 TABLET BY MOUTH 2 TIMES DAILY AS NEEDED FOR MODERATE PAIN   Quantity:  90 tablet   Refills:  0       omeprazole 20 MG CR capsule   Commonly known as:  priLOSEC   This may have changed:  See the new instructions.   Used for:  Gastroesophageal reflux disease without esophagitis   Changed by:  Ekaterina Guaman NP        TAKE 1 CAPSULE (20 MG) BY MOUTH DAILY   Quantity:  90 capsule   Refills:  1         Stop taking these medicines if you haven't already. Please contact your care team if you have questions.     hydrochlorothiazide 12.5 MG capsule   Commonly known as:  MICROZIDE   Replaced by:  hydrochlorothiazide 25 MG tablet   Stopped by:  Ekaterina Guaman NP                Where to get your medicines      These medications were sent to [facility] [id number]    Hours:  Tech issues with their phone system Phone:  [phone]     estradiol 0.5 MG tablet    hydrochlorothiazide 25 MG tablet    losartan 100 MG tablet    naproxen 500 MG tablet    omeprazole 20 MG CR capsule                Primary Care Provider Office Phone # Fax #    Ekaterina Guaman [phone]       [facility] [id number] [place] [address]        Equal Access to Services     PHILIPP REID AH: Hadii rangel ku hadasho Soomaali, waaxda luqadaha, qaybta kaalmada adeegyada, lia raymundo haygwendolyn penaloza . So [facility] [phone].    ATENCIÓN: Si habla español, tiene a juárez disposición servicios gratuitos de asistencia lingüística. Isamar al [phone].    We comply with applicable federal civil rights laws and [place] laws. We do not discriminate on the basis of  race, color, national origin, age, disability, sex, sexual orientation, or gender identity.            Thank you!     Thank you for choosing [facility]  for your care. Our goal is always to provide you with excellent care. Hearing back from our patients is one way we can continue to improve our services. Please take a few minutes to complete the written survey that you may receive in the mail after your visit with us. Thank you!             Your Updated Medication List - Protect others around you: Learn how to safely use, store and throw away your medicines at www.disposemymeds.org.          This list is accurate as of 5/14/18  1:35 PM.  Always use your most recent med list.                   Brand Name Dispense Instructions for use Diagnosis    estradiol 0.5 MG tablet    ESTRACE    90 tablet    TAKE 1 TABLET (0.5 MG) BY MOUTH DAILY    Encounter for routine adult health examination without abnormal findings       hydrochlorothiazide 25 MG tablet    HYDRODIURIL    90 tablet    Take 1 tablet (25 mg) by mouth daily    Essential hypertension       losartan 100 MG tablet    COZAAR    90 tablet    TAKE 1 TABLET (100 MG) BY MOUTH DAILY    Essential hypertension       naproxen 500 MG tablet    NAPROSYN    90 tablet    TAKE 1 TABLET BY MOUTH 2 TIMES DAILY AS NEEDED FOR MODERATE PAIN    Encounter for routine adult health examination without abnormal findings       omeprazole 20 MG CR capsule    priLOSEC    90 capsule    TAKE 1 CAPSULE (20 MG) BY MOUTH DAILY    Gastroesophageal reflux disease without esophagitis

## 2018-05-14 NOTE — NURSING NOTE
Linda Whyte is a 62 year old patient who comes in today for a Blood Pressure check.  Initial BP:  There were no vitals taken for this visit.     Data Unavailable  Disposition: provider notified while patient in the clinic

## 2018-05-14 NOTE — MR AVS SNAPSHOT
After Visit Summary   5/14/2018    Linda Whyte    MRN: [id number]           Patient Information     Date Of Birth          1955        Visit Information        Provider Department      5/14/2018 2:00 PM IZAIAH FABIAN/LPN [facility]        Today's Diagnoses     BP check    -  1       Follow-ups after your visit        Future tests that were ordered for you today     Open Future Orders        Priority Expected Expires Ordered    Comprehensive metabolic panel Routine  5/14/2019 5/14/2018    Lipid panel reflex to direct LDL Fasting Routine  5/14/2019 5/14/2018            Who to contact     If you have questions or need follow up information about today's clinic visit or your schedule please contact [facility] directly at [phone].  Normal or non-critical lab and imaging results will be communicated to you by [employer]-Ray[employer], letter or phone within 4 business days after the clinic has received the results. If you do not hear from us within 7 days, please contact the clinic through [employer] or phone. If you have a critical or abnormal lab result, we will notify you by phone as soon as possible.  Submit refill requests through Xola or call your pharmacy and they will forward the refill request to us. Please allow 3 business days for your refill to be completed.          Additional Information About Your Visit        MyChart Information     Xola gives you secure access to your electronic health record. If you see a primary care provider, you can also send messages to your care team and make appointments. If you have questions, please call your primary care clinic.  If you do not have a primary care provider, please call [phone] and they will assist you.        Care EveryWhere ID     This is your Care EveryWhere ID. This could be used by other organizations to access your [facility] medical records  [id number]        Your Vitals Were     Breastfeeding?                   No             Blood Pressure from Last 3 Encounters:   05/14/18 162/90   05/14/18 (!) 150/100   10/17/17 136/84    Weight from Last 3 Encounters:   05/14/18 162 lb 14.4 oz (73.9 kg)   10/17/17 163 lb (73.9 kg)   04/13/17 163 lb 4.8 oz (74.1 kg)              Today, you had the following     No orders found for display         Today's Medication Changes          These changes are accurate as of 5/14/18 11:59 PM.  If you have any questions, ask your nurse or doctor.               Start taking these medicines.        Dose/Directions    hydrochlorothiazide 25 MG tablet   Commonly known as:  HYDRODIURIL   Used for:  Essential hypertension   Replaces:  hydrochlorothiazide 12.5 MG capsule   Started by:  Ekaterina Guaman NP        Dose:  25 mg   Take 1 tablet (25 mg) by mouth daily   Quantity:  90 tablet   Refills:  3       traZODone 50 MG tablet   Commonly known as:  DESYREL   Used for:  Insomnia, unspecified type   Started by:  Ekaterina Guaman NP        Dose:  50 mg   Take 1 tablet (50 mg) by mouth nightly as needed for sleep   Quantity:  30 tablet   Refills:  1         These medicines have changed or have updated prescriptions.        Dose/Directions    estradiol 0.5 MG tablet   Commonly known as:  ESTRACE   This may have changed:  See the new instructions.   Used for:  Encounter for routine adult health examination without abnormal findings   Changed by:  Ekaterina Guaman NP        TAKE 1 TABLET (0.5 MG) BY MOUTH DAILY   Quantity:  90 tablet   Refills:  3       losartan 100 MG tablet   Commonly known as:  COZAAR   This may have changed:  See the new instructions.   Used for:  Essential hypertension   Changed by:  Ekaterina Guaman NP        TAKE 1 TABLET (100 MG) BY MOUTH DAILY   Quantity:  90 tablet   Refills:  3       naproxen 500 MG tablet   Commonly known as:  NAPROSYN   This may have changed:  See the new instructions.   Used for:  Encounter for routine adult health examination without abnormal findings   Changed by:   Ekaterina Guaman NP        TAKE 1 TABLET BY MOUTH 2 TIMES DAILY AS NEEDED FOR MODERATE PAIN   Quantity:  90 tablet   Refills:  0       omeprazole 20 MG CR capsule   Commonly known as:  priLOSEC   This may have changed:  See the new instructions.   Used for:  Gastroesophageal reflux disease without esophagitis   Changed by:  Ekaterina Guaman NP        TAKE 1 CAPSULE (20 MG) BY MOUTH DAILY   Quantity:  90 capsule   Refills:  1         Stop taking these medicines if you haven't already. Please contact your care team if you have questions.     hydrochlorothiazide 12.5 MG capsule   Commonly known as:  MICROZIDE   Replaced by:  hydrochlorothiazide 25 MG tablet   Stopped by:  Ekaterina Guaman NP                Where to get your medicines      These medications were sent to [address] [address]    Hours:  Tech issues with their phone system Phone:  [phone]     estradiol 0.5 MG tablet    hydrochlorothiazide 25 MG tablet    losartan 100 MG tablet    naproxen 500 MG tablet    omeprazole 20 MG CR capsule    traZODone 50 MG tablet                Primary Care Provider Office Phone # Fax #    Ekaterina Guaman [phone]       [facility] [place] [address] [address] [facility] [address]        Equal Access to Services     PHILIPP REID AH: Hadii rangel ku hadasho Soomaali, waaxda luqadaha, qaybta kaalmada adeegyada, waxay idiin hayserenan [facility] [place]. So [facility] [phone].    ATENCIÓN: Si habla español, tiene a juárez disposición servicios gratuitos de asistencia lingüística. Llame al [phone].    We comply with applicable federal civil rights laws and [place] laws. We do not discriminate on the basis of race, color, national origin, age, disability, sex, sexual orientation, or gender identity.            Thank you!     Thank you for choosing [facility]  for your care. Our goal is always to provide you with  excellent care. Hearing back from our patients is one way we can continue to improve our services. Please take a few minutes to complete the written survey that you may receive in the mail after your visit with us. Thank you!             Your Updated Medication List - Protect others around you: Learn how to safely use, store and throw away your medicines at www.disposemymeds.org.          This list is accurate as of 5/14/18 11:59 PM.  Always use your most recent med list.                   Brand Name Dispense Instructions for use Diagnosis    estradiol 0.5 MG tablet    ESTRACE    90 tablet    TAKE 1 TABLET (0.5 MG) BY MOUTH DAILY    Encounter for routine adult health examination without abnormal findings       hydrochlorothiazide 25 MG tablet    HYDRODIURIL    90 tablet    Take 1 tablet (25 mg) by mouth daily    Essential hypertension       losartan 100 MG tablet    COZAAR    90 tablet    TAKE 1 TABLET (100 MG) BY MOUTH DAILY    Essential hypertension       naproxen 500 MG tablet    NAPROSYN    90 tablet    TAKE 1 TABLET BY MOUTH 2 TIMES DAILY AS NEEDED FOR MODERATE PAIN    Encounter for routine adult health examination without abnormal findings       omeprazole 20 MG CR capsule    priLOSEC    90 capsule    TAKE 1 CAPSULE (20 MG) BY MOUTH DAILY    Gastroesophageal reflux disease without esophagitis       traZODone 50 MG tablet    DESYREL    30 tablet    Take 1 tablet (50 mg) by mouth nightly as needed for sleep    Insomnia, unspecified type

## 2018-05-14 NOTE — NURSING NOTE
"Chief Complaint   Patient presents with     Physical       Initial BP (!) 150/100 (BP Location: Right arm, Patient Position: Chair, Cuff Size: Adult Regular)  Pulse 70  Temp 98.4  F (36.9  C) (Oral)  Resp 16  Ht 5' 3\" (1.6 m)  Wt 162 lb 14.4 oz (73.9 kg)  SpO2 98%  BMI 28.86 kg/m2 Estimated body mass index is 28.86 kg/(m^2) as calculated from the following:    Height as of this encounter: 5' 3\" (1.6 m).    Weight as of this encounter: 162 lb 14.4 oz (73.9 kg).  Medication Reconciliation: complete      Health Maintenance addressed:  NONE    n/a    KELL Mathias    "

## 2018-05-14 NOTE — PROGRESS NOTES
SUBJECTIVE:   CC: Linda Whyte is an 62 year old woman who presents for preventive health visit.     Physical   Annual:     Getting at least 3 servings of Calcium per day::  Yes    Bi-annual eye exam::  Yes    Dental care twice a year::  Yes    Sleep apnea or symptoms of sleep apnea::  None    Taking medications regularly::  Yes    Medication side effects::  None    Additional concerns today::  No          Here for fasting labs and to discuss her blood pressure. Under an enormous amount of stress with her 34 year daughter suddenly passing away in 2018 due to subglottic stenosis.  suddenly one weekend. Not sleeping well. Trying to cope. Has a supportive family including  and two remaining daughters. Lives in [place] part of the year and [place] the remainder. Blood pressure has been elevated.     BP    Today's PHQ-2 Score:   PHQ-2 (  [employer]) 2018   Q1: Little interest or pleasure in doing things 1   Q2: Feeling down, depressed or hopeless 1   PHQ-2 Score 2   Q1: Little interest or pleasure in doing things Several days   Q2: Feeling down, depressed or hopeless Several days   PHQ-2 Score 2       Abuse: Current or Past(Physical, Sexual or Emotional)- No  Do you feel safe in your environment - Yes    Social History   Substance Use Topics     Smoking status: Never Smoker     Smokeless tobacco: Never Used     Alcohol use Yes      Comment: occ     Alcohol Use 2018   If you drink alcohol do you typically have greater than 3 drinks per day OR greater than 7 drinks per week? No       Reviewed orders with patient.  Reviewed health maintenance and updated orders accordingly - Yes  BP Readings from Last 3 Encounters:   18 162/90   18 (!) 150/100   10/17/17 136/84    Wt Readings from Last 3 Encounters:   18 162 lb 14.4 oz (73.9 kg)   10/17/17 163 lb (73.9 kg)   17 163 lb 4.8 oz (74.1 kg)                    Patient over age 50, mutual decision to screen reflected in  health maintenance.    Pertinent mammograms are reviewed under the imaging tab.  History of abnormal Pap smear: NO - age 30-65 PAP every 5 years with negative HPV co-testing recommended    Reviewed and updated as needed this visit by clinical staff  Tobacco  Allergies  Med Hx  Surg Hx  Fam Hx  Soc Hx        Reviewed and updated as needed this visit by Provider            Review of Systems  CONSTITUTIONAL: NEGATIVE for fever, chills, change in weight  INTEGUMENTARY/SKIN: NEGATIVE for worrisome rashes, moles or lesions  EYES: NEGATIVE for vision changes or irritation  ENT: NEGATIVE for ear, mouth and throat problems  RESP: NEGATIVE for significant cough or SOB  BREAST: NEGATIVE for masses, tenderness or discharge  CV: NEGATIVE for chest pain, palpitations or peripheral edema  GI: NEGATIVE for nausea, abdominal pain, heartburn, or change in bowel habits  : NEGATIVE for unusual urinary or vaginal symptoms. No vaginal bleeding.  MUSCULOSKELETAL: NEGATIVE for significant arthralgias or myalgia  NEURO: NEGATIVE for weakness, dizziness or paresthesias  PSYCHIATRIC: NEGATIVE for changes in mood or affect      OBJECTIVE:   There were no vitals taken for this visit.  Physical Exam  GENERAL: healthy, alert and no distress  EYES: Eyes grossly normal to inspection, PERRL and conjunctivae and sclerae normal  HENT: ear canals and TM's normal, nose and mouth without ulcers or lesions  NECK: no adenopathy, no asymmetry, masses, or scars and thyroid normal to palpation  RESP: lungs clear to auscultation - no rales, rhonchi or wheezes  CV: regular rate and rhythm, normal S1 S2, no S3 or S4, no murmur, click or rub, no peripheral edema and peripheral pulses strong  ABDOMEN: soft, nontender, no hepatosplenomegaly, no masses and bowel sounds normal  MS: left shoulder decreased range of motion.   SKIN: no suspicious lesions or rashes  PSYCH: mentation appears normal, affect normal/bright    ASSESSMENT/PLAN:   1. Routine general  "medical examination at a health care facility  Fasting labs ordered today.   - Lipid panel reflex to direct LDL Fasting; Future   estradiol (ESTRACE) 0.5 MG tablet; TAKE 1 TABLET (0.5 MG) BY MOUTH DAILY  Dispense: 90 tablet; Refill: 3  - naproxen (NAPROSYN) 500 MG tablet; TAKE 1 TABLET BY MOUTH 2 TIMES DAILY AS NEEDED FOR MODERATE PAIN  Dispense: 90 tablet; Refill: 0      2. Essential hypertension  CMP. Refilled cozaar and have increased HCTZ to 25 mg once daily  - Comprehensive metabolic panel; Future  - losartan (COZAAR) 100 MG tablet; TAKE 1 TABLET (100 MG) BY MOUTH DAILY  Dispense: 90 tablet; Refill: 3  - hydrochlorothiazide (HYDRODIURIL) 25 MG tablet; Take 1 tablet (25 mg) by mouth daily  Dispense: 90 tablet; Refill: 3    - estradiol (ESTRACE) 0.5 MG tablet; TAKE 1 TABLET (0.5 MG) BY MOUTH DAILY  Dispense: 90 tablet; Refill: 3  - naproxen (NAPROSYN) 500 MG tablet; TAKE 1 TABLET BY MOUTH 2 TIMES DAILY AS NEEDED FOR MODERATE PAIN  Dispense: 90 tablet; Refill: 0    4. Gastroesophageal reflux disease without esophagitis  Stable.   - omeprazole (PRILOSEC) 20 MG CR capsule; TAKE 1 CAPSULE (20 MG) BY MOUTH DAILY  Dispense: 90 capsule; Refill: 1    5. Insomnia, unspecified type  Stable  May want to start SSRI if mood does not improve.   - traZODone (DESYREL) 50 MG tablet; Take 1 tablet (50 mg) by mouth nightly as needed for sleep  Dispense: 30 tablet; Refill: 1    COUNSELING:  Reviewed preventive health counseling, as reflected in patient instructions       Regular exercise       Healthy diet/nutrition         reports that she has never smoked. She has never used smokeless tobacco.    Estimated body mass index is 28.87 kg/(m^2) as calculated from the following:    Height as of 10/17/17: 5' 3\" (1.6 m).    Weight as of 10/17/17: 163 lb (73.9 kg).   Weight management plan: Discussed healthy diet and exercise guidelines and patient will follow up in 12 months in clinic to re-evaluate.    Counseling Resources:  ATP IV " Guidelines  Pooled Cohorts Equation Calculator  Breast Cancer Risk Calculator  FRAX Risk Assessment  ICSI Preventive Guidelines  Dietary Guidelines for Americans, 2010  [employer]'s MyPlate  ASA Prophylaxis  Lung CA Screening    Ekaterina Guaman NP  [facility]  Answers for HPI/ROS submitted by the patient on 5/14/2018   PHQ-2 Score: 2

## 2018-05-24 DIAGNOSIS — I10 ESSENTIAL HYPERTENSION: ICD-10-CM

## 2018-05-24 DIAGNOSIS — Z00.00 ROUTINE GENERAL MEDICAL EXAMINATION AT A HEALTH CARE FACILITY: ICD-10-CM

## 2018-05-24 LAB
ALBUMIN SERPL-MCNC: 3.4 G/DL (ref 3.4–5)
ALP SERPL-CCNC: 63 U/L (ref 40–150)
ALT SERPL W P-5'-P-CCNC: 34 U/L (ref 0–50)
ANION GAP SERPL CALCULATED.3IONS-SCNC: 10 MMOL/L (ref 3–14)
AST SERPL W P-5'-P-CCNC: 23 U/L (ref 0–45)
BILIRUB SERPL-MCNC: 0.4 MG/DL (ref 0.2–1.3)
BUN SERPL-MCNC: 17 MG/DL (ref 7–30)
CALCIUM SERPL-MCNC: 8.6 MG/DL (ref 8.5–10.1)
CHLORIDE SERPL-SCNC: 105 MMOL/L (ref 94–109)
CHOLEST SERPL-MCNC: 196 MG/DL
CO2 SERPL-SCNC: 25 MMOL/L (ref 20–32)
CREAT SERPL-MCNC: 0.66 MG/DL (ref 0.52–1.04)
GFR SERPL CREATININE-BSD FRML MDRD: >90 ML/MIN/1.7M2
GLUCOSE SERPL-MCNC: 110 MG/DL (ref 70–99)
HDLC SERPL-MCNC: 57 MG/DL
LDLC SERPL CALC-MCNC: 107 MG/DL
NONHDLC SERPL-MCNC: 139 MG/DL
POTASSIUM SERPL-SCNC: 3.8 MMOL/L (ref 3.4–5.3)
PROT SERPL-MCNC: 6.8 G/DL (ref 6.8–8.8)
SODIUM SERPL-SCNC: 140 MMOL/L (ref 133–144)
TRIGL SERPL-MCNC: 160 MG/DL

## 2018-05-24 PROCEDURE — 36415 COLL VENOUS BLD VENIPUNCTURE: CPT | Performed by: NURSE PRACTITIONER

## 2018-05-24 PROCEDURE — 80053 COMPREHEN METABOLIC PANEL: CPT | Performed by: NURSE PRACTITIONER

## 2018-05-24 PROCEDURE — 80061 LIPID PANEL: CPT | Performed by: NURSE PRACTITIONER

## 2018-06-27 DIAGNOSIS — K21.9 GASTROESOPHAGEAL REFLUX DISEASE WITHOUT ESOPHAGITIS: ICD-10-CM

## 2018-06-27 NOTE — TELEPHONE ENCOUNTER
"Requested Prescriptions   Pending Prescriptions Disp Refills     omeprazole (PRILOSEC) 20 MG CR capsule [Pharmacy Med Name: OMEPRAZOLE DR 20 MG CAPSULE] 90 capsule 0    Last Written Prescription Date:  05/14/2018  Last Fill Quantity: 90 capsule,  # refills: 1   Last office visit: 5/14/2018 with prescribing provider:  05/14/2018   Future Office Visit:     Sig: TAKE 1 CAPSULE (20 MG) BY MOUTH DAILY    PPI Protocol Passed    6/27/2018  9:45 AM       Passed - Not on Clopidogrel (unless Pantoprazole ordered)       Passed - No diagnosis of osteoporosis on record       Passed - Recent (12 mo) or future (30 days) visit within the authorizing provider's specialty    Patient had office visit in the last 12 months or has a visit in the next 30 days with authorizing provider or within the authorizing provider's specialty.  See \"Patient Info\" tab in inbasket, or \"Choose Columns\" in Meds & Orders section of the refill encounter.           Passed - Patient is age 18 or older       Passed - No active pregnacy on record       Passed - No positive pregnancy test in past 12 months          Avel MONTANO  "

## 2018-06-27 NOTE — TELEPHONE ENCOUNTER
Duplicate  E-Prescribing Status: Receipt confirmed by pharmacy (5/14/2018  1:32 PM CDT)  Aga Smith RN, BSN

## 2018-07-04 DIAGNOSIS — Z00.00 ENCOUNTER FOR ROUTINE ADULT HEALTH EXAMINATION WITHOUT ABNORMAL FINDINGS: ICD-10-CM

## 2018-07-05 RX ORDER — ESTRADIOL 0.5 MG/1
TABLET ORAL
Qty: 90 TABLET | Refills: 0 | OUTPATIENT
Start: 2018-07-05

## 2018-07-05 NOTE — TELEPHONE ENCOUNTER
"Requested Prescriptions   Pending Prescriptions Disp Refills     estradiol (ESTRACE) 0.5 MG tablet [Pharmacy Med Name: ESTRADIOL 0.5 MG TABLET] 90 tablet 0    Last Written Prescription Date:  05/14/2018  Last Fill Quantity: 90 tablet,  # refills: 3   Last office visit: 5/14/2018 with prescribing provider:  05/14/2018   Future Office Visit:     Sig: TAKE 1 TABLET (0.5 MG) BY MOUTH DAILY    Hormone Replacement Therapy Failed    7/4/2018  1:38 AM       Failed - Blood pressure under 140/90 in past 12 months    BP Readings from Last 3 Encounters:   05/14/18 162/90   05/14/18 (!) 150/100   10/17/17 136/84                Passed - Recent (12 mo) or future (30 days) visit within the authorizing provider's specialty    Patient had office visit in the last 12 months or has a visit in the next 30 days with authorizing provider or within the authorizing provider's specialty.  See \"Patient Info\" tab in inbasket, or \"Choose Columns\" in Meds & Orders section of the refill encounter.           Passed - Patient has mammogram in past 2 years on file if age 50-75       Passed - Patient is 18 years of age or older       Passed - No active pregnancy on record       Passed - No positive pregnancy test on record in past 12 months          Avel MONTANO  "

## 2018-08-02 RX ORDER — HYDROCHLOROTHIAZIDE 12.5 MG/1
CAPSULE ORAL
Qty: 90 CAPSULE | Refills: 0 | OUTPATIENT
Start: 2018-08-02

## 2018-12-19 DIAGNOSIS — G47.00 INSOMNIA, UNSPECIFIED TYPE: ICD-10-CM

## 2018-12-19 DIAGNOSIS — K21.9 GASTROESOPHAGEAL REFLUX DISEASE WITHOUT ESOPHAGITIS: ICD-10-CM

## 2018-12-19 NOTE — TELEPHONE ENCOUNTER
"Requested Prescriptions   Pending Prescriptions Disp Refills     traZODone (DESYREL) 50 MG tablet 30 tablet 1    Last Written Prescription Date:  05/14/2018  Last Fill Quantity: 30 tablet,  # refills: 1   Last office visit: 5/14/2018 with prescribing provider:  05/14/2018   Future Office Visit:     Sig: Take 1 tablet (50 mg) by mouth nightly as needed for sleep    Serotonin Modulators Failed - 12/19/2018  2:11 PM       Failed - Recent (12 mo) or future (30 days) visit within the authorizing provider's specialty    Patient had office visit in the last 12 months or has a visit in the next 30 days with authorizing provider or within the authorizing provider's specialty.  See \"Patient Info\" tab in inbasket, or \"Choose Columns\" in Meds & Orders section of the refill encounter.             Passed - Patient is age 18 or older       Passed - No active pregnancy on record       Passed - No positive pregnancy test in past 12 months              omeprazole (PRILOSEC) 20 MG DR capsule 90 capsule 1    Last Written Prescription Date:  05/14/2018  Last Fill Quantity: 90 capsule,  # refills: 1   Last office visit: 5/14/2018 with prescribing provider:  05/14/2018   Future Office Visit:     Sig: TAKE 1 CAPSULE (20 MG) BY MOUTH DAILY    PPI Protocol Failed - 12/19/2018  2:11 PM       Failed - Recent (12 mo) or future (30 days) visit within the authorizing provider's specialty    Patient had office visit in the last 12 months or has a visit in the next 30 days with authorizing provider or within the authorizing provider's specialty.  See \"Patient Info\" tab in inSimplyCastsket, or \"Choose Columns\" in Meds & Orders section of the refill encounter.             Passed - Not on Clopidogrel (unless Pantoprazole ordered)       Passed - No diagnosis of osteoporosis on record       Passed - Patient is age 18 or older       Passed - No active pregnacy on record       Passed - No positive pregnancy test in past 12 months        Avel MONTANO  "

## 2018-12-19 NOTE — TELEPHONE ENCOUNTER
LM with male at pt's pone number she needs f/u in clinic as she was f/u on BP back in May and did not do so.     She needs to est care with a new PCP at this time.     Dorothea Jimenez RN

## 2018-12-20 RX ORDER — TRAZODONE HYDROCHLORIDE 50 MG/1
50 TABLET, FILM COATED ORAL
Qty: 30 TABLET | Refills: 0 | Status: SHIPPED | OUTPATIENT
Start: 2018-12-20

## 2019-01-11 DIAGNOSIS — G47.00 INSOMNIA, UNSPECIFIED TYPE: ICD-10-CM

## 2019-01-11 RX ORDER — TRAZODONE HYDROCHLORIDE 50 MG/1
50 TABLET, FILM COATED ORAL
Qty: 30 TABLET | Refills: 0 | OUTPATIENT
Start: 2019-01-11

## 2019-01-11 NOTE — TELEPHONE ENCOUNTER
"Requested Prescriptions   Pending Prescriptions Disp Refills     traZODone (DESYREL) 50 MG tablet  PATIENT REQUESTING 90 DAY SUPPLY.  Last Written Prescription Date:  12/20/18  Last Fill Quantity: 30 TABLET,  # refills: 0   Last office visit: 5/14/2018 with prescribing provider:  SANDRINE   Future Office Visit:     30 tablet 0     Sig: Take 1 tablet (50 mg) by mouth nightly as needed for sleep    Serotonin Modulators Failed - 1/11/2019 10:49 AM       Failed - Recent (12 mo) or future (30 days) visit within the authorizing provider's specialty    Patient had office visit in the last 12 months or has a visit in the next 30 days with authorizing provider or within the authorizing provider's specialty.  See \"Patient Info\" tab in inbasket, or \"Choose Columns\" in Meds & Orders section of the refill encounter.             Passed - Medication is active on med list       Passed - Patient is age 18 or older       Passed - No active pregnancy on record       Passed - No positive pregnancy test in past 12 months          "

## 2019-01-14 DIAGNOSIS — G47.00 INSOMNIA, UNSPECIFIED TYPE: ICD-10-CM

## 2019-01-14 NOTE — LETTER
January 15, 2019      Linda Whyte  [address]  [place] [id number]        Dear Linda,     We recently received a call from your pharmacy requesting a refill of your medication (trazodone).   A review of your chart indicates that an appointment is required. Please contact our office at [phone] to schedule your doctor's appointment.     Unfortunately Ekaterina Guaman has left the [facility].  You will need to schedule an appointment to establish care with one of the provider here in the clinic.    We have authorized one refill of your medication to allow time for you to schedule your appointment.   Taking care of your health is important to us and ongoing visits with your provider are vital to your care. We look forward to seeing you in the near future.        Sincerely,    Ramona Ann Aaseby-Aguilera, PA-C/Aga Smith RN, BSN

## 2019-01-15 RX ORDER — TRAZODONE HYDROCHLORIDE 50 MG/1
50 TABLET, FILM COATED ORAL
Qty: 30 TABLET | Refills: 0 | OUTPATIENT
Start: 2019-01-15

## 2019-01-15 NOTE — TELEPHONE ENCOUNTER
"Requested Prescriptions   Pending Prescriptions Disp Refills     traZODone (DESYREL) 50 MG tablet  Last Written Prescription Date:  12/20/2018  Last Fill Quantity: 30,  # refills: 0   Last office visit: 5/14/2018 with prescribing provider:  05/14/2018   Future Office Visit:     30 tablet 0     Sig: Take 1 tablet (50 mg) by mouth nightly as needed for sleep    Serotonin Modulators Failed - 1/14/2019  8:30 PM       Failed - Recent (12 mo) or future (30 days) visit within the authorizing provider's specialty    Patient had office visit in the last 12 months or has a visit in the next 30 days with authorizing provider or within the authorizing provider's specialty.  See \"Patient Info\" tab in inbasket, or \"Choose Columns\" in Meds & Orders section of the refill encounter.             Passed - Medication is active on med list       Passed - Patient is age 18 or older       Passed - No active pregnancy on record       Passed - No positive pregnancy test in past 12 months          "

## 2019-01-15 NOTE — TELEPHONE ENCOUNTER
Routing refill request to provider for review/approval because:  Hollie given x1 and patient did not follow up, please advise    Pt did scheduled appt for 1/4/19 and then cancelled it the same day the one time refill went to the pharmacy    Aga Smith RN, BSN

## 2019-02-10 DIAGNOSIS — Z00.00 ENCOUNTER FOR ROUTINE ADULT HEALTH EXAMINATION WITHOUT ABNORMAL FINDINGS: ICD-10-CM

## 2019-02-10 NOTE — TELEPHONE ENCOUNTER
"Requested Prescriptions   Pending Prescriptions Disp Refills     estradiol (ESTRACE) 0.5 MG tablet  Last Written Prescription Date:  05/14/2018  Last Fill Quantity: 90,  # refills: 3   Last office visit: 5/8/2017 with prescribing provider:  05/14/2018   Future Office Visit:     90 tablet 3     Sig: TAKE 1 TABLET (0.5 MG) BY MOUTH DAILY    Hormone Replacement Therapy Failed - 2/10/2019  1:41 PM       Failed - Blood pressure under 140/90 in past 12 months    BP Readings from Last 3 Encounters:   05/14/18 162/90   05/14/18 (!) 150/100   10/17/17 136/84                Failed - Recent (12 mo) or future (30 days) visit within the authorizing provider's specialty    Patient had office visit in the last 12 months or has a visit in the next 30 days with authorizing provider or within the authorizing provider's specialty.  See \"Patient Info\" tab in inbasket, or \"Choose Columns\" in Meds & Orders section of the refill encounter.             Passed - Patient has mammogram in past 2 years on file if age 50-75       Passed - Medication is active on med list       Passed - Patient is 18 years of age or older       Passed - No active pregnancy on record       Passed - No positive pregnancy test on record in past 12 months        **PT REQUESTING 90 DAY SUPPLY**  "

## 2019-02-11 NOTE — TELEPHONE ENCOUNTER
Call attempted but  full    Pt needs to est care with a new provider.  Letter was mailed 1/15/19    Aga Smith RN, BSN

## 2019-02-12 RX ORDER — ESTRADIOL 0.5 MG/1
TABLET ORAL
Qty: 90 TABLET | Refills: 3 | Status: SHIPPED | OUTPATIENT
Start: 2019-02-12

## 2019-02-12 NOTE — TELEPHONE ENCOUNTER
Pt is in need of OV and has not f/u     Has elevated BP was to f/u for 2 week BP check last May and did not do so.   She has not been seen since 5/14/18 and that was with JEANNA.     She was given 30 day RX in December -  [facility] denied refills in January.     Pt has received letter and calls of appt reminder and was sent my chart today     RX denied today     Dorothea Jimenez RN

## 2019-05-23 ENCOUNTER — TELEPHONE (OUTPATIENT)
Dept: FAMILY MEDICINE | Facility: CLINIC | Age: 64
End: 2019-05-23

## 2019-05-23 NOTE — TELEPHONE ENCOUNTER
Patient called asked us to push her mammogram to [facility]. I checked with Radiology and they can't push image to [facility]. Patient will need to come to clinic to  the cd and bring it to [facility].  Nora Arenas

## 2019-06-24 DIAGNOSIS — I10 ESSENTIAL HYPERTENSION: ICD-10-CM

## 2019-06-24 NOTE — TELEPHONE ENCOUNTER
"Requested Prescriptions   Pending Prescriptions Disp Refills     losartan (COZAAR) 100 MG tablet 90 tablet 3     Sig: TAKE 1 TABLET (100 MG) BY MOUTH DAILY     Last Written Prescription Date:  05/14/2018  Last Fill Quantity: 90 tablet,  # refills: 3   Last office visit: 5/14/2018 with prescribing provider:  05/14/2018   Future Office Visit:          Angiotensin-II Receptors Failed - 6/24/2019 11:51 AM        Failed - Blood pressure under 140/90 in past 12 months     BP Readings from Last 3 Encounters:   05/14/18 162/90   05/14/18 (!) 150/100   10/17/17 136/84                 Failed - Recent (12 mo) or future (30 days) visit within the authorizing provider's specialty     Patient had office visit in the last 12 months or has a visit in the next 30 days with authorizing provider or within the authorizing provider's specialty.  See \"Patient Info\" tab in inbasket, or \"Choose Columns\" in Meds & Orders section of the refill encounter.              Failed - Normal serum creatinine on file in past 12 months     Recent Labs   Lab Test 05/24/18  1013   CR 0.66             Failed - Normal serum potassium on file in past 12 months     Recent Labs   Lab Test 05/24/18  1013   POTASSIUM 3.8                    Passed - Medication is active on med list        Passed - Patient is age 18 or older        Passed - No active pregnancy on record        Passed - No positive pregnancy test in past 12 months        Avel VENEGAST  "

## 2019-06-25 RX ORDER — LOSARTAN POTASSIUM 100 MG/1
TABLET ORAL
Qty: 90 TABLET | Refills: 3 | OUTPATIENT
Start: 2019-06-25

## 2020-03-10 ENCOUNTER — HEALTH MAINTENANCE LETTER (OUTPATIENT)
Age: 65
End: 2020-03-10

## 2020-12-27 ENCOUNTER — HEALTH MAINTENANCE LETTER (OUTPATIENT)
Age: 65
End: 2020-12-27

## 2021-01-15 ENCOUNTER — HEALTH MAINTENANCE LETTER (OUTPATIENT)
Age: 66
End: 2021-01-15

## 2021-10-09 ENCOUNTER — HEALTH MAINTENANCE LETTER (OUTPATIENT)
Age: 66
End: 2021-10-09

## 2022-01-29 ENCOUNTER — HEALTH MAINTENANCE LETTER (OUTPATIENT)
Age: 67
End: 2022-01-29

## 2022-03-20 ENCOUNTER — HEALTH MAINTENANCE LETTER (OUTPATIENT)
Age: 67
End: 2022-03-20

## 2022-09-11 ENCOUNTER — HEALTH MAINTENANCE LETTER (OUTPATIENT)
Age: 67
End: 2022-09-11

## 2023-05-06 ENCOUNTER — HEALTH MAINTENANCE LETTER (OUTPATIENT)
Age: 68
End: 2023-05-06

## 2023-12-21 ENCOUNTER — TRANSFERRED RECORDS (OUTPATIENT)
Dept: HEALTH INFORMATION MANAGEMENT | Facility: CLINIC | Age: 68
End: 2023-12-21
Payer: COMMERCIAL